# Patient Record
Sex: FEMALE | Race: WHITE | Employment: FULL TIME
[De-identification: names, ages, dates, MRNs, and addresses within clinical notes are randomized per-mention and may not be internally consistent; named-entity substitution may affect disease eponyms.]

---

## 2017-06-02 ENCOUNTER — TELEPHONE (OUTPATIENT)
Dept: OBGYN | Facility: CLINIC | Age: 27
End: 2017-06-02

## 2017-06-02 DIAGNOSIS — E28.2 PCOS (POLYCYSTIC OVARIAN SYNDROME): ICD-10-CM

## 2017-06-05 RX ORDER — NORGESTIMATE AND ETHINYL ESTRADIOL 0.25-0.035
KIT ORAL
Qty: 28 TABLET | Refills: 5 | Status: SHIPPED | OUTPATIENT
Start: 2017-06-05 | End: 2018-07-23 | Stop reason: SDUPTHER

## 2017-08-08 ENCOUNTER — OFFICE VISIT (OUTPATIENT)
Dept: PRIMARY CARE CLINIC | Age: 27
End: 2017-08-08
Payer: COMMERCIAL

## 2017-08-08 VITALS
DIASTOLIC BLOOD PRESSURE: 101 MMHG | WEIGHT: 293 LBS | HEIGHT: 64 IN | TEMPERATURE: 97.2 F | HEART RATE: 66 BPM | BODY MASS INDEX: 50.02 KG/M2 | RESPIRATION RATE: 18 BRPM | SYSTOLIC BLOOD PRESSURE: 138 MMHG

## 2017-08-08 DIAGNOSIS — R73.01 ELEVATED FASTING GLUCOSE: ICD-10-CM

## 2017-08-08 DIAGNOSIS — Z13.220 LIPID SCREENING: ICD-10-CM

## 2017-08-08 DIAGNOSIS — R45.0 NERVOUSNESS: ICD-10-CM

## 2017-08-08 DIAGNOSIS — I10 ESSENTIAL HYPERTENSION: Primary | ICD-10-CM

## 2017-08-08 PROCEDURE — 99214 OFFICE O/P EST MOD 30 MIN: CPT | Performed by: NURSE PRACTITIONER

## 2017-08-08 RX ORDER — LISINOPRIL 10 MG/1
10 TABLET ORAL DAILY
Qty: 90 TABLET | Refills: 1 | Status: SHIPPED | OUTPATIENT
Start: 2017-08-08 | End: 2017-08-22 | Stop reason: SDUPTHER

## 2017-08-08 RX ORDER — ESCITALOPRAM OXALATE 10 MG/1
10 TABLET ORAL DAILY
Qty: 90 TABLET | Refills: 1 | Status: SHIPPED | OUTPATIENT
Start: 2017-08-08 | End: 2019-10-25 | Stop reason: SINTOL

## 2017-08-08 ASSESSMENT — ENCOUNTER SYMPTOMS
CONSTIPATION: 0
COUGH: 0
NAUSEA: 0
WHEEZING: 0
ABDOMINAL PAIN: 0
VOMITING: 0
RHINORRHEA: 0
SORE THROAT: 0
DIARRHEA: 0
SHORTNESS OF BREATH: 0

## 2017-08-08 ASSESSMENT — PATIENT HEALTH QUESTIONNAIRE - PHQ9
2. FEELING DOWN, DEPRESSED OR HOPELESS: 1
SUM OF ALL RESPONSES TO PHQ QUESTIONS 1-9: 1
1. LITTLE INTEREST OR PLEASURE IN DOING THINGS: 0
SUM OF ALL RESPONSES TO PHQ9 QUESTIONS 1 & 2: 1

## 2017-08-17 ENCOUNTER — TELEPHONE (OUTPATIENT)
Dept: PRIMARY CARE CLINIC | Age: 27
End: 2017-08-17

## 2017-08-17 ENCOUNTER — HOSPITAL ENCOUNTER (OUTPATIENT)
Age: 27
Discharge: HOME | End: 2017-08-17
Payer: COMMERCIAL

## 2017-08-17 DIAGNOSIS — R73.01 ELEVATED FASTING GLUCOSE: ICD-10-CM

## 2017-08-17 DIAGNOSIS — Z13.220 LIPID SCREENING: ICD-10-CM

## 2017-08-17 DIAGNOSIS — I10 ESSENTIAL HYPERTENSION: ICD-10-CM

## 2017-08-17 LAB
ABSOLUTE EOS #: 0.3 K/UL (ref 0–0.4)
ABSOLUTE LYMPH #: 2.5 K/UL (ref 1–4.8)
ABSOLUTE MONO #: 0.6 K/UL (ref 0–1)
ALBUMIN SERPL-MCNC: 4.1 G/DL (ref 3.5–5.2)
ALBUMIN/GLOBULIN RATIO: 1 (ref 1–2.5)
ALP BLD-CCNC: 42 U/L (ref 35–104)
ALT SERPL-CCNC: 27 U/L (ref 5–33)
ANION GAP SERPL CALCULATED.3IONS-SCNC: 12 MMOL/L (ref 9–17)
AST SERPL-CCNC: 22 U/L
BASOPHILS # BLD: 0 %
BASOPHILS ABSOLUTE: 0 K/UL (ref 0–0.2)
BILIRUB SERPL-MCNC: 0.36 MG/DL (ref 0.3–1.2)
BUN BLDV-MCNC: 11 MG/DL (ref 6–20)
BUN/CREAT BLD: 16 (ref 9–20)
CALCIUM SERPL-MCNC: 9.2 MG/DL (ref 8.6–10.4)
CHLORIDE BLD-SCNC: 101 MMOL/L (ref 98–107)
CHOLESTEROL/HDL RATIO: 3.1
CHOLESTEROL: 165 MG/DL
CO2: 25 MMOL/L (ref 20–31)
CREAT SERPL-MCNC: 0.68 MG/DL (ref 0.5–0.9)
CREATININE URINE: 92.2 MG/DL (ref 28–217)
DIFFERENTIAL TYPE: ABNORMAL
EOSINOPHILS RELATIVE PERCENT: 3 %
ESTIMATED AVERAGE GLUCOSE: 103 MG/DL
GFR AFRICAN AMERICAN: >60 ML/MIN
GFR NON-AFRICAN AMERICAN: >60 ML/MIN
GFR SERPL CREATININE-BSD FRML MDRD: NORMAL ML/MIN/{1.73_M2}
GFR SERPL CREATININE-BSD FRML MDRD: NORMAL ML/MIN/{1.73_M2}
GLUCOSE BLD-MCNC: 95 MG/DL (ref 70–99)
HBA1C MFR BLD: 5.2 % (ref 4.8–5.9)
HCT VFR BLD CALC: 40.4 % (ref 36–46)
HDLC SERPL-MCNC: 54 MG/DL
HEMOGLOBIN: 13.6 G/DL (ref 12–16)
LDL CHOLESTEROL: 89 MG/DL (ref 0–130)
LYMPHOCYTES # BLD: 24 %
MCH RBC QN AUTO: 26.8 PG (ref 26–34)
MCHC RBC AUTO-ENTMCNC: 33.6 G/DL (ref 31–37)
MCV RBC AUTO: 79.8 FL (ref 80–100)
MICROALBUMIN/CREAT 24H UR: <12 MG/L
MICROALBUMIN/CREAT UR-RTO: 13 MCG/MG CREAT
MONOCYTES # BLD: 6 %
PDW BLD-RTO: 14.2 % (ref 12.1–15.2)
PLATELET # BLD: 267 K/UL (ref 140–450)
PLATELET ESTIMATE: ABNORMAL
PMV BLD AUTO: 9.9 FL (ref 6–12)
POTASSIUM SERPL-SCNC: 4.4 MMOL/L (ref 3.7–5.3)
RBC # BLD: 5.06 M/UL (ref 4–5.2)
RBC # BLD: ABNORMAL 10*6/UL
SEG NEUTROPHILS: 67 %
SEGMENTED NEUTROPHILS ABSOLUTE COUNT: 7 K/UL (ref 1.8–7.7)
SODIUM BLD-SCNC: 138 MMOL/L (ref 135–144)
TOTAL PROTEIN: 8.1 G/DL (ref 6.4–8.3)
TRIGL SERPL-MCNC: 109 MG/DL
VLDLC SERPL CALC-MCNC: NORMAL MG/DL (ref 1–30)
WBC # BLD: 10.4 K/UL (ref 3.5–11)
WBC # BLD: ABNORMAL 10*3/UL

## 2017-08-17 PROCEDURE — 80053 COMPREHEN METABOLIC PANEL: CPT

## 2017-08-17 PROCEDURE — 83036 HEMOGLOBIN GLYCOSYLATED A1C: CPT

## 2017-08-17 PROCEDURE — 85025 COMPLETE CBC W/AUTO DIFF WBC: CPT

## 2017-08-17 PROCEDURE — 82043 UR ALBUMIN QUANTITATIVE: CPT

## 2017-08-17 PROCEDURE — 80061 LIPID PANEL: CPT

## 2017-08-17 PROCEDURE — 82570 ASSAY OF URINE CREATININE: CPT

## 2017-08-17 PROCEDURE — 36415 COLL VENOUS BLD VENIPUNCTURE: CPT

## 2017-08-22 ENCOUNTER — NURSE ONLY (OUTPATIENT)
Dept: PRIMARY CARE CLINIC | Age: 27
End: 2017-08-22

## 2017-08-22 VITALS
WEIGHT: 293 LBS | RESPIRATION RATE: 18 BRPM | DIASTOLIC BLOOD PRESSURE: 91 MMHG | TEMPERATURE: 97.1 F | HEIGHT: 64 IN | BODY MASS INDEX: 50.02 KG/M2 | SYSTOLIC BLOOD PRESSURE: 142 MMHG | HEART RATE: 77 BPM

## 2017-08-22 DIAGNOSIS — I10 ESSENTIAL HYPERTENSION: ICD-10-CM

## 2017-08-22 RX ORDER — LISINOPRIL 10 MG/1
20 TABLET ORAL DAILY
Qty: 90 TABLET | Refills: 1 | Status: SHIPPED | OUTPATIENT
Start: 2017-08-22 | End: 2017-08-22 | Stop reason: SDUPTHER

## 2017-08-22 RX ORDER — LISINOPRIL 20 MG/1
20 TABLET ORAL DAILY
Qty: 90 TABLET | Refills: 1 | Status: SHIPPED | OUTPATIENT
Start: 2017-08-22 | End: 2020-08-13

## 2018-02-06 ENCOUNTER — HOSPITAL ENCOUNTER (EMERGENCY)
Age: 28
Discharge: HOME | End: 2018-02-06
Payer: COMMERCIAL

## 2018-02-06 VITALS
TEMPERATURE: 98.8 F | HEART RATE: 77 BPM | SYSTOLIC BLOOD PRESSURE: 144 MMHG | RESPIRATION RATE: 14 BRPM | OXYGEN SATURATION: 98 % | DIASTOLIC BLOOD PRESSURE: 85 MMHG

## 2018-02-06 DIAGNOSIS — R11.2 NAUSEA VOMITING AND DIARRHEA: Primary | ICD-10-CM

## 2018-02-06 DIAGNOSIS — R19.7 NAUSEA VOMITING AND DIARRHEA: Primary | ICD-10-CM

## 2018-02-06 LAB
-: ABNORMAL
ABSOLUTE EOS #: 0.4 K/UL (ref 0–0.4)
ABSOLUTE IMMATURE GRANULOCYTE: ABNORMAL K/UL (ref 0–0.3)
ABSOLUTE LYMPH #: 2.5 K/UL (ref 1–4.8)
ABSOLUTE MONO #: 0.7 K/UL (ref 0–1)
ALBUMIN SERPL-MCNC: 3.8 G/DL (ref 3.5–5.2)
ALBUMIN/GLOBULIN RATIO: 1 (ref 1–2.5)
ALP BLD-CCNC: 47 U/L (ref 35–104)
ALT SERPL-CCNC: 37 U/L (ref 5–33)
AMORPHOUS: ABNORMAL
ANION GAP SERPL CALCULATED.3IONS-SCNC: 11 MMOL/L (ref 9–17)
AST SERPL-CCNC: 22 U/L
BACTERIA: ABNORMAL
BASOPHILS # BLD: 1 % (ref 0–2)
BASOPHILS ABSOLUTE: 0.1 K/UL (ref 0–0.2)
BILIRUB SERPL-MCNC: 0.41 MG/DL (ref 0.3–1.2)
BILIRUBIN URINE: NEGATIVE
BUN BLDV-MCNC: 13 MG/DL (ref 6–20)
BUN/CREAT BLD: 22 (ref 9–20)
CALCIUM SERPL-MCNC: 9.1 MG/DL (ref 8.6–10.4)
CASTS UA: ABNORMAL /LPF
CHLORIDE BLD-SCNC: 101 MMOL/L (ref 98–107)
CO2: 26 MMOL/L (ref 20–31)
COLOR: YELLOW
COMMENT UA: ABNORMAL
CREAT SERPL-MCNC: 0.6 MG/DL (ref 0.5–0.9)
CRYSTALS, UA: ABNORMAL /HPF
DIFFERENTIAL TYPE: ABNORMAL
DIRECT EXAM: NORMAL
EOSINOPHILS RELATIVE PERCENT: 4 % (ref 0–8)
EPITHELIAL CELLS UA: ABNORMAL /HPF (ref 0–25)
GFR AFRICAN AMERICAN: >60 ML/MIN
GFR NON-AFRICAN AMERICAN: >60 ML/MIN
GFR SERPL CREATININE-BSD FRML MDRD: ABNORMAL ML/MIN/{1.73_M2}
GFR SERPL CREATININE-BSD FRML MDRD: ABNORMAL ML/MIN/{1.73_M2}
GLUCOSE BLD-MCNC: 127 MG/DL (ref 70–99)
GLUCOSE URINE: NEGATIVE
HCG(URINE) PREGNANCY TEST: NEGATIVE
HCT VFR BLD CALC: 41 % (ref 36–46)
HEMOGLOBIN: 13.6 G/DL (ref 12–16)
IMMATURE GRANULOCYTES: ABNORMAL %
KETONES, URINE: NEGATIVE
LACTIC ACID, WHOLE BLOOD: NORMAL MMOL/L (ref 0.7–2.1)
LACTIC ACID: 0.9 MMOL/L (ref 0.5–2.2)
LEUKOCYTE ESTERASE, URINE: ABNORMAL
LIPASE: 19 U/L (ref 13–60)
LYMPHOCYTES # BLD: 24 % (ref 24–44)
Lab: NORMAL
MCH RBC QN AUTO: 26.4 PG (ref 26–34)
MCHC RBC AUTO-ENTMCNC: 33.2 G/DL (ref 31–37)
MCV RBC AUTO: 79.7 FL (ref 80–100)
MONOCYTES # BLD: 7 % (ref 0–12)
MUCUS: ABNORMAL
NITRITE, URINE: NEGATIVE
NRBC AUTOMATED: ABNORMAL PER 100 WBC
OTHER OBSERVATIONS UA: ABNORMAL
PDW BLD-RTO: 13.9 % (ref 12.1–15.2)
PH UA: 7.5 (ref 5–9)
PLATELET # BLD: 304 K/UL (ref 140–450)
PLATELET ESTIMATE: ABNORMAL
PMV BLD AUTO: 9.1 FL (ref 6–12)
POTASSIUM SERPL-SCNC: 4.2 MMOL/L (ref 3.7–5.3)
PROTEIN UA: NEGATIVE
RBC # BLD: 5.15 M/UL (ref 4–5.2)
RBC # BLD: ABNORMAL 10*6/UL
RBC UA: ABNORMAL /HPF (ref 0–2)
RENAL EPITHELIAL, UA: ABNORMAL /HPF
SEG NEUTROPHILS: 64 % (ref 36–66)
SEGMENTED NEUTROPHILS ABSOLUTE COUNT: 6.7 K/UL (ref 1.8–7.7)
SODIUM BLD-SCNC: 138 MMOL/L (ref 135–144)
SPECIFIC GRAVITY UA: 1.01 (ref 1.01–1.02)
SPECIMEN DESCRIPTION: NORMAL
STATUS: NORMAL
TOTAL PROTEIN: 7.7 G/DL (ref 6.4–8.3)
TRICHOMONAS: ABNORMAL
TURBIDITY: CLEAR
URINE HGB: NEGATIVE
UROBILINOGEN, URINE: NORMAL
WBC # BLD: 10.5 K/UL (ref 3.5–11)
WBC # BLD: ABNORMAL 10*3/UL
WBC UA: ABNORMAL /HPF (ref 0–5)
YEAST: ABNORMAL

## 2018-02-06 PROCEDURE — 6360000002 HC RX W HCPCS: Performed by: PHYSICIAN ASSISTANT

## 2018-02-06 PROCEDURE — 6370000000 HC RX 637 (ALT 250 FOR IP): Performed by: PHYSICIAN ASSISTANT

## 2018-02-06 PROCEDURE — 36415 COLL VENOUS BLD VENIPUNCTURE: CPT

## 2018-02-06 PROCEDURE — 87804 INFLUENZA ASSAY W/OPTIC: CPT

## 2018-02-06 PROCEDURE — 84703 CHORIONIC GONADOTROPIN ASSAY: CPT

## 2018-02-06 PROCEDURE — 80053 COMPREHEN METABOLIC PANEL: CPT

## 2018-02-06 PROCEDURE — 81001 URINALYSIS AUTO W/SCOPE: CPT

## 2018-02-06 PROCEDURE — 83605 ASSAY OF LACTIC ACID: CPT

## 2018-02-06 PROCEDURE — 85025 COMPLETE CBC W/AUTO DIFF WBC: CPT

## 2018-02-06 PROCEDURE — 96374 THER/PROPH/DIAG INJ IV PUSH: CPT

## 2018-02-06 PROCEDURE — 96361 HYDRATE IV INFUSION ADD-ON: CPT

## 2018-02-06 PROCEDURE — 87086 URINE CULTURE/COLONY COUNT: CPT

## 2018-02-06 PROCEDURE — 99283 EMERGENCY DEPT VISIT LOW MDM: CPT

## 2018-02-06 PROCEDURE — 96375 TX/PRO/DX INJ NEW DRUG ADDON: CPT

## 2018-02-06 PROCEDURE — 83690 ASSAY OF LIPASE: CPT

## 2018-02-06 PROCEDURE — 2580000003 HC RX 258: Performed by: PHYSICIAN ASSISTANT

## 2018-02-06 RX ORDER — DIPHENHYDRAMINE HYDROCHLORIDE 50 MG/ML
25 INJECTION INTRAMUSCULAR; INTRAVENOUS ONCE
Status: COMPLETED | OUTPATIENT
Start: 2018-02-06 | End: 2018-02-06

## 2018-02-06 RX ORDER — DEXAMETHASONE SODIUM PHOSPHATE 4 MG/ML
4 INJECTION, SOLUTION INTRA-ARTICULAR; INTRALESIONAL; INTRAMUSCULAR; INTRAVENOUS; SOFT TISSUE ONCE
Status: COMPLETED | OUTPATIENT
Start: 2018-02-06 | End: 2018-02-06

## 2018-02-06 RX ORDER — 0.9 % SODIUM CHLORIDE 0.9 %
1000 INTRAVENOUS SOLUTION INTRAVENOUS ONCE
Status: COMPLETED | OUTPATIENT
Start: 2018-02-06 | End: 2018-02-06

## 2018-02-06 RX ORDER — DICYCLOMINE HYDROCHLORIDE 10 MG/1
10 CAPSULE ORAL ONCE
Status: COMPLETED | OUTPATIENT
Start: 2018-02-06 | End: 2018-02-06

## 2018-02-06 RX ORDER — DICYCLOMINE HYDROCHLORIDE 10 MG/1
10 CAPSULE ORAL
Qty: 30 CAPSULE | Refills: 0 | Status: SHIPPED | OUTPATIENT
Start: 2018-02-06 | End: 2019-06-18 | Stop reason: ALTCHOICE

## 2018-02-06 RX ORDER — ONDANSETRON 4 MG/1
4 TABLET, ORALLY DISINTEGRATING ORAL EVERY 8 HOURS PRN
Qty: 12 TABLET | Refills: 0 | Status: SHIPPED | OUTPATIENT
Start: 2018-02-06 | End: 2019-06-18 | Stop reason: ALTCHOICE

## 2018-02-06 RX ORDER — KETOROLAC TROMETHAMINE 30 MG/ML
30 INJECTION, SOLUTION INTRAMUSCULAR; INTRAVENOUS ONCE
Status: COMPLETED | OUTPATIENT
Start: 2018-02-06 | End: 2018-02-06

## 2018-02-06 RX ORDER — ONDANSETRON 2 MG/ML
4 INJECTION INTRAMUSCULAR; INTRAVENOUS ONCE
Status: COMPLETED | OUTPATIENT
Start: 2018-02-06 | End: 2018-02-06

## 2018-02-06 RX ADMIN — SODIUM CHLORIDE 1000 ML: 9 INJECTION, SOLUTION INTRAVENOUS at 14:42

## 2018-02-06 RX ADMIN — ONDANSETRON 4 MG: 2 INJECTION INTRAMUSCULAR; INTRAVENOUS at 14:43

## 2018-02-06 RX ADMIN — DIPHENHYDRAMINE HYDROCHLORIDE 25 MG: 50 INJECTION, SOLUTION INTRAMUSCULAR; INTRAVENOUS at 16:14

## 2018-02-06 RX ADMIN — KETOROLAC TROMETHAMINE 30 MG: 30 INJECTION, SOLUTION INTRAMUSCULAR at 16:11

## 2018-02-06 RX ADMIN — DICYCLOMINE HYDROCHLORIDE 10 MG: 10 CAPSULE ORAL at 14:44

## 2018-02-06 RX ADMIN — DEXAMETHASONE SODIUM PHOSPHATE 4 MG: 4 INJECTION, SOLUTION INTRAMUSCULAR; INTRAVENOUS at 16:13

## 2018-02-06 ASSESSMENT — ENCOUNTER SYMPTOMS
NAUSEA: 1
RHINORRHEA: 0
BLOOD IN STOOL: 0
SORE THROAT: 0
EYE REDNESS: 0
CHEST TIGHTNESS: 0
COUGH: 0
VOMITING: 1
BACK PAIN: 0
DIARRHEA: 1
CONSTIPATION: 0
ABDOMINAL PAIN: 1
WHEEZING: 0
SHORTNESS OF BREATH: 0
EYE DISCHARGE: 0

## 2018-02-06 ASSESSMENT — PAIN SCALES - GENERAL
PAINLEVEL_OUTOF10: 7
PAINLEVEL_OUTOF10: 5

## 2018-02-06 ASSESSMENT — PAIN DESCRIPTION - PAIN TYPE: TYPE: ACUTE PAIN

## 2018-02-06 ASSESSMENT — PAIN DESCRIPTION - DESCRIPTORS: DESCRIPTORS: DISCOMFORT

## 2018-02-06 ASSESSMENT — PAIN DESCRIPTION - LOCATION: LOCATION: ABDOMEN;HEAD

## 2018-02-06 ASSESSMENT — PAIN DESCRIPTION - FREQUENCY: FREQUENCY: CONTINUOUS

## 2018-02-06 ASSESSMENT — PAIN DESCRIPTION - ORIENTATION: ORIENTATION: MID

## 2018-02-06 NOTE — ED PROVIDER NOTES
and polyuria. Genitourinary: Negative for decreased urine volume, difficulty urinating, dysuria, frequency and hematuria. Musculoskeletal: Negative for arthralgias, back pain and myalgias. Skin: Negative for pallor and rash. Allergic/Immunologic: Negative for food allergies and immunocompromised state. Neurological: Negative for dizziness, syncope, weakness and light-headedness. Hematological: Negative for adenopathy. Does not bruise/bleed easily. Psychiatric/Behavioral: Negative for behavioral problems and suicidal ideas. The patient is not nervous/anxious. Except as noted above the remainder of the review of systems was reviewed and negative. PAST MEDICAL HISTORY     Past Medical History:   Diagnosis Date    Hypertension     Obesity          SURGICAL HISTORY     History reviewed. No pertinent surgical history. CURRENT MEDICATIONS       Discharge Medication List as of 2/6/2018  4:37 PM      CONTINUE these medications which have NOT CHANGED    Details   lisinopril (PRINIVIL;ZESTRIL) 20 MG tablet Take 1 tablet by mouth daily, Disp-90 tablet, R-1Normal      escitalopram (LEXAPRO) 10 MG tablet Take 1 tablet by mouth daily, Disp-90 tablet, R-1Normal      norgestimate-ethinyl estradiol (SPRINTEC 28) 0.25-35 MG-MCG per tablet TAKE ONE TABLET BY MOUTH ONCE DAILY, Disp-28 tablet, R-5Normal      metFORMIN (GLUCOPHAGE) 500 MG tablet TAKE ONE TABLET BY MOUTH THREE TIMES DAILY, Disp-90 tablet, R-3Normal      Multiple Vitamins-Minerals (MULTI FOR HER PO) Take 1 capsule by mouth daily      acetaminophen (TYLENOL) 500 MG tablet Take  by mouth every 6 hours as needed. ALLERGIES     Review of patient's allergies indicates no known allergies.     FAMILY HISTORY       Family History   Problem Relation Age of Onset    Cancer Mother     Cancer Father     Diabetes Maternal Grandmother     Heart Disease Maternal Grandmother     Cancer Paternal Grandmother     Cancer Paternal Grandfather SOCIAL HISTORY       Social History     Social History    Marital status: Single     Spouse name: N/A    Number of children: N/A    Years of education: N/A     Social History Main Topics    Smoking status: Never Smoker    Smokeless tobacco: Never Used    Alcohol use 0.0 oz/week     1 Glasses of wine, 1 Standard drinks or equivalent per week      Comment: Occ    Drug use: No    Sexual activity: Yes     Partners: Male     Other Topics Concern    None     Social History Narrative    None       SCREENINGS    Brandon Coma Scale  Eye Opening: Spontaneous  Best Verbal Response: Oriented  Best Motor Response: Obeys commands  Brandon Coma Scale Score: 15        PHYSICAL EXAM    (up to 7 for level 4, 8 or more for level 5)     ED Triage Vitals [02/06/18 1425]   BP Temp Temp Source Pulse Resp SpO2 Height Weight   (!) 174/108 98.8 °F (37.1 °C) Tympanic 67 16 96 % -- --       Physical Exam   Constitutional: She is oriented to person, place, and time. She appears well-developed and well-nourished. No distress. HENT:   Head: Normocephalic and atraumatic. Right Ear: External ear normal.   Left Ear: External ear normal.   Mouth/Throat: Oropharynx is clear and moist. No oropharyngeal exudate. Eyes: Conjunctivae and EOM are normal. Pupils are equal, round, and reactive to light. Right eye exhibits no discharge. Left eye exhibits no discharge. No scleral icterus. Neck: Normal range of motion. Neck supple. No tracheal deviation present. Cardiovascular: Normal rate, regular rhythm and intact distal pulses. Exam reveals no gallop and no friction rub. No murmur heard. Pulmonary/Chest: Effort normal and breath sounds normal. No stridor. No respiratory distress. She has no wheezes. Abdominal: Soft. Bowel sounds are normal. She exhibits no distension. There is no rigidity, no rebound, no guarding and no CVA tenderness. Diffuse discomfort to abdomen no point tenderness abdomen soft.   No rigidity no

## 2018-02-07 LAB
CULTURE: NORMAL
CULTURE: NORMAL
Lab: NORMAL
SPECIMEN DESCRIPTION: NORMAL
SPECIMEN DESCRIPTION: NORMAL
STATUS: NORMAL

## 2018-07-23 DIAGNOSIS — E28.2 PCOS (POLYCYSTIC OVARIAN SYNDROME): ICD-10-CM

## 2018-07-23 RX ORDER — NORGESTIMATE AND ETHINYL ESTRADIOL 0.25-0.035
KIT ORAL
Qty: 28 TABLET | Refills: 12 | Status: SHIPPED | OUTPATIENT
Start: 2018-07-23 | End: 2019-11-04 | Stop reason: ALTCHOICE

## 2018-07-23 NOTE — TELEPHONE ENCOUNTER
Patient scheduled for yearly on 08/20/18. She states has not been having her periods and has been taking metformin and sprintec \"as needed. \"      Next Visit Date:  Future Appointments  Date Time Provider Benny Villai   8/20/2018 2:00 PM ERLINDA Weber - [facility]              Health Maintenance   Topic Date Due    DTaP/Tdap/Td vaccine (1 - Tdap) 02/25/2008    Flu vaccine (1) 09/01/2018    Cervical cancer screen  01/25/2019    Potassium monitoring  02/06/2019    Creatinine monitoring  02/06/2019    HIV screen  Addressed             (applicable per patient's age: Cancer Screenings, Depression Screening, Fall Risk Screening, Immunizations)    Hemoglobin A1C (%)   Date Value   08/17/2017 5.2     Microalb/Crt.  Ratio (mcg/mg creat)   Date Value   08/17/2017 13     LDL Cholesterol (mg/dL)   Date Value   08/17/2017 89     AST (U/L)   Date Value   02/06/2018 22     ALT (U/L)   Date Value   02/06/2018 37 (H)     BUN (mg/dL)   Date Value   02/06/2018 13      (goal A1C is < 7)   (goal LDL is <100) need 30-50% reduction from baseline     BP Readings from Last 3 Encounters:   02/06/18 (!) 144/85   08/22/17 (!) 142/91   08/08/17 (!) 138/101    (goal /80)      All Future Testing planned in [facility]:  Lab Frequency Next Occurrence       Patient Active Problem List:     Fatigue     Morbid obesity with BMI of 50.0-59.9, adult (HCC)     Essential hypertension     Nervousness

## 2019-06-18 ENCOUNTER — HOSPITAL ENCOUNTER (OUTPATIENT)
Age: 29
Setting detail: SPECIMEN
Discharge: HOME | End: 2019-06-18
Payer: COMMERCIAL

## 2019-06-18 ENCOUNTER — OFFICE VISIT (OUTPATIENT)
Dept: OBGYN | Age: 29
End: 2019-06-18
Payer: COMMERCIAL

## 2019-06-18 VITALS
DIASTOLIC BLOOD PRESSURE: 74 MMHG | HEIGHT: 64 IN | SYSTOLIC BLOOD PRESSURE: 136 MMHG | BODY MASS INDEX: 50.02 KG/M2 | WEIGHT: 293 LBS

## 2019-06-18 DIAGNOSIS — Z01.419 WOMEN'S ANNUAL ROUTINE GYNECOLOGICAL EXAMINATION: ICD-10-CM

## 2019-06-18 DIAGNOSIS — N91.2 AMENORRHEA: ICD-10-CM

## 2019-06-18 DIAGNOSIS — Z01.419 WOMEN'S ANNUAL ROUTINE GYNECOLOGICAL EXAMINATION: Primary | ICD-10-CM

## 2019-06-18 PROCEDURE — 99395 PREV VISIT EST AGE 18-39: CPT | Performed by: ADVANCED PRACTICE MIDWIFE

## 2019-06-18 PROCEDURE — 87624 HPV HI-RISK TYP POOLED RSLT: CPT

## 2019-06-18 PROCEDURE — G0145 SCR C/V CYTO,THINLAYER,RESCR: HCPCS

## 2019-06-18 RX ORDER — MEDROXYPROGESTERONE ACETATE 10 MG/1
10 TABLET ORAL DAILY
Qty: 10 TABLET | Refills: 0 | Status: SHIPPED | OUTPATIENT
Start: 2019-06-18 | End: 2019-11-04 | Stop reason: ALTCHOICE

## 2019-06-18 ASSESSMENT — ENCOUNTER SYMPTOMS
EYES NEGATIVE: 1
ALLERGIC/IMMUNOLOGIC NEGATIVE: 1
RESPIRATORY NEGATIVE: 1
GASTROINTESTINAL NEGATIVE: 1
BACK PAIN: 1

## 2019-06-18 NOTE — PROGRESS NOTES
never    Last Mammogram: never    Last Dexascan never    Last colorectal screen- type:n/a  date  n/a    Do you do self breast exams: Yes    Past Medical History:   Diagnosis Date    Hypertension     Obesity        History reviewed. No pertinent surgical history. Family History   Problem Relation Age of Onset    Cancer Mother     Cancer Father     Diabetes Maternal Grandmother     Heart Disease Maternal Grandmother     Cancer Paternal Grandmother     Cancer Paternal Grandfather        Chief Complaint   Patient presents with    Gynecologic Exam          PE:  Vital Signs  Blood pressure 136/74, height 5' 4\" (1.626 m), weight (!) 309 lb (140.2 kg), last menstrual period 03/18/2019, not currently breastfeeding. Labs:    No results found for this visit on 06/18/19. NURSE: DIANA    HPI: pt here for yearly well woman and pap. She states that she would like to try to get pregnant but her periods are not regular. She was on OCP for treatment of her PCOS and she had more regular cycles but since she has been off she has not had a period. She would like to try something to help her ovulate regularly. Review of Systems   Constitutional: Positive for activity change and appetite change. Negative for chills, diaphoresis, fatigue, fever and unexpected weight change. Pt actively trying to lose weight. HENT: Negative. Eyes: Negative. Respiratory: Negative. Cardiovascular: Negative for chest pain, palpitations and leg swelling. Gastrointestinal: Negative. Endocrine: Negative. Genitourinary: Positive for menstrual problem. Negative for decreased urine volume, difficulty urinating, dyspareunia, dysuria, enuresis, flank pain, frequency, genital sores, hematuria, pelvic pain, urgency, vaginal bleeding, vaginal discharge and vaginal pain. Musculoskeletal: Positive for back pain. Negative for arthralgias, gait problem, joint swelling, myalgias and neck pain.         Low back pain normal, Lesions absent, Discharge absent, Tenderness absent, Enlargement absent, Nodularity absent  Uterus:  Size normal, Contour normal, Position normal, Masses absent, Consistency; normal, Support normal, Tenderness absent  Adenexa:   Masses absent, Tenderness absent, Enlargement absent, Nodularity absent                                    Vaginal discharge: no vaginal discharge, no urethral discharge, no vulvar exudate

## 2019-06-21 LAB
CYTOLOGY REPORT: NORMAL
HPV SAMPLE: NORMAL
HPV, GENOTYPE 16: NOT DETECTED
HPV, GENOTYPE 18: NOT DETECTED
HPV, HIGH RISK OTHER: NOT DETECTED
HPV, INTERPRETATION: NORMAL
SPECIMEN DESCRIPTION: NORMAL

## 2019-06-26 DIAGNOSIS — I10 ESSENTIAL HYPERTENSION: ICD-10-CM

## 2019-06-27 RX ORDER — LISINOPRIL 20 MG/1
20 TABLET ORAL DAILY
Qty: 90 TABLET | Refills: 1 | OUTPATIENT
Start: 2019-06-27

## 2019-07-09 ENCOUNTER — OFFICE VISIT (OUTPATIENT)
Dept: OBGYN | Age: 29
End: 2019-07-09
Payer: COMMERCIAL

## 2019-07-09 VITALS
WEIGHT: 293 LBS | BODY MASS INDEX: 50.02 KG/M2 | DIASTOLIC BLOOD PRESSURE: 86 MMHG | SYSTOLIC BLOOD PRESSURE: 136 MMHG | HEIGHT: 64 IN

## 2019-07-09 DIAGNOSIS — N91.2 AMENORRHEA: Primary | ICD-10-CM

## 2019-07-09 LAB
CONTROL: PRESENT
PREGNANCY TEST URINE, POC: NORMAL

## 2019-07-09 PROCEDURE — 81025 URINE PREGNANCY TEST: CPT | Performed by: ADVANCED PRACTICE MIDWIFE

## 2019-07-09 PROCEDURE — 99213 OFFICE O/P EST LOW 20 MIN: CPT | Performed by: ADVANCED PRACTICE MIDWIFE

## 2019-07-09 ASSESSMENT — PATIENT HEALTH QUESTIONNAIRE - PHQ9: DEPRESSION UNABLE TO ASSESS: PT REFUSES

## 2019-07-09 NOTE — PROGRESS NOTES
PROBLEM VISIT     Date of service: 2019    Moira Carmona  Is a 26 y.o. single female    PT's PCP is: ERLINDA Harding - JOSE MIGUEL     : 1990                                             Subjective:       Patient's last menstrual period was 2019. OB History   No data available        Social History     Tobacco Use   Smoking Status Never Smoker   Smokeless Tobacco Never Used        Social History     Substance and Sexual Activity   Alcohol Use Yes    Alcohol/week: 0.0 oz    Types: 1 Glasses of wine, 1 Standard drinks or equivalent per week    Comment: Occ       Social History     Substance and Sexual Activity   Sexual Activity Yes    Partners: Male       Allergies: Patient has no known allergies. Chief Complaint   Patient presents with    Irregular Menses     pt was put on provera but still has not started her period. Last Yearly:  2019    Last pap: 19    Last HPV: 19 neg     PE:  Vital Signs  Blood pressure 136/86, height 5' 4\" (1.626 m), weight (!) 303 lb (137.4 kg), last menstrual period 2019, not currently breastfeeding. NURSE: ANATOLIY    HPI: pt here today and states she took the provera but has not started her period yet. Yes  PT denies fever, chills, nausea and vomiting       Assessment and Plan          Diagnosis Orders   1. Amenorrhea  POCT urine pregnancy     If period starts by  run  day progesterone    If period does not start do a MRI of the brain and PCOS labs with tsh        I am having Ibeth HAWK Moro maintain her acetaminophen, Multiple Vitamins-Minerals (MULTI FOR HER PO), escitalopram, lisinopril, metFORMIN, norgestimate-ethinyl estradiol, and medroxyPROGESTERone. No follow-ups on file. There are no Patient Instructions on file for this visit. Over 50% of time spent on counseling and care coordination on: see assessment and plan,  She was also counseled on her preventative health maintenance recommendations and follow-up.

## 2019-07-16 ENCOUNTER — TELEPHONE (OUTPATIENT)
Dept: OBGYN | Age: 29
End: 2019-07-16

## 2019-07-16 ENCOUNTER — HOSPITAL ENCOUNTER (OUTPATIENT)
Age: 29
Setting detail: SPECIMEN
Discharge: HOME | End: 2019-07-16
Payer: COMMERCIAL

## 2019-07-16 DIAGNOSIS — N91.2 AMENORRHEA: Primary | ICD-10-CM

## 2019-07-17 ENCOUNTER — HOSPITAL ENCOUNTER (OUTPATIENT)
Age: 29
Discharge: HOME | End: 2019-07-17
Payer: COMMERCIAL

## 2019-07-17 DIAGNOSIS — N91.2 AMENORRHEA: ICD-10-CM

## 2019-07-17 LAB
ESTRADIOL LEVEL: 47 PG/ML (ref 27–314)
FOLLICLE STIMULATING HORMONE: 8.2 U/L (ref 1.7–21.5)
HCG QUANTITATIVE: <1 IU/L
LH: 9 U/L (ref 1–95.6)
PROGESTERONE LEVEL: 0.08 NG/ML
PROLACTIN: 9.92 UG/L (ref 4.79–23.3)
TSH SERPL DL<=0.05 MIU/L-ACNC: 2.6 MIU/L (ref 0.3–5)

## 2019-07-17 PROCEDURE — 84443 ASSAY THYROID STIM HORMONE: CPT

## 2019-07-17 PROCEDURE — 84144 ASSAY OF PROGESTERONE: CPT

## 2019-07-17 PROCEDURE — 83001 ASSAY OF GONADOTROPIN (FSH): CPT

## 2019-07-17 PROCEDURE — 36415 COLL VENOUS BLD VENIPUNCTURE: CPT

## 2019-07-17 PROCEDURE — 83002 ASSAY OF GONADOTROPIN (LH): CPT

## 2019-07-17 PROCEDURE — 82670 ASSAY OF TOTAL ESTRADIOL: CPT

## 2019-07-17 PROCEDURE — 84702 CHORIONIC GONADOTROPIN TEST: CPT

## 2019-07-17 PROCEDURE — 84146 ASSAY OF PROLACTIN: CPT

## 2019-08-28 DIAGNOSIS — E28.2 PCOS (POLYCYSTIC OVARIAN SYNDROME): ICD-10-CM

## 2019-09-12 ENCOUNTER — OFFICE VISIT (OUTPATIENT)
Dept: OBGYN | Age: 29
End: 2019-09-12
Payer: COMMERCIAL

## 2019-09-12 VITALS
HEIGHT: 64 IN | WEIGHT: 293 LBS | DIASTOLIC BLOOD PRESSURE: 78 MMHG | BODY MASS INDEX: 50.02 KG/M2 | SYSTOLIC BLOOD PRESSURE: 122 MMHG

## 2019-09-12 DIAGNOSIS — E28.2 PCOS (POLYCYSTIC OVARIAN SYNDROME): Primary | ICD-10-CM

## 2019-09-12 DIAGNOSIS — N91.2 AMENORRHEA: ICD-10-CM

## 2019-09-12 PROCEDURE — 76830 TRANSVAGINAL US NON-OB: CPT | Performed by: OBSTETRICS & GYNECOLOGY

## 2019-09-12 PROCEDURE — 99213 OFFICE O/P EST LOW 20 MIN: CPT | Performed by: ADVANCED PRACTICE MIDWIFE

## 2019-09-12 RX ORDER — CIPROFLOXACIN HYDROCHLORIDE 3.5 MG/ML
SOLUTION/ DROPS TOPICAL
COMMUNITY
Start: 2019-07-31 | End: 2019-11-04 | Stop reason: ALTCHOICE

## 2019-09-12 ASSESSMENT — PATIENT HEALTH QUESTIONNAIRE - PHQ9: DEPRESSION UNABLE TO ASSESS: PT REFUSES

## 2019-09-12 NOTE — PROGRESS NOTES
ciprofloxacin. Return in about 1 week (around 9/19/2019) for EMB- for calcification on endometrium. There are no Patient Instructions on file for this visit. Over 50% of time spent on counseling and care coordination on: see assessment and plan,  She was also counseled on her preventative health maintenance recommendations and follow-up.         FF time: 15 min      Kelby Dubin Pool,9/12/2019 4:54 PM

## 2019-09-23 DIAGNOSIS — N91.2 AMENORRHEA: Primary | ICD-10-CM

## 2019-10-14 ENCOUNTER — HOSPITAL ENCOUNTER (OUTPATIENT)
Age: 29
Discharge: HOME | End: 2019-10-14
Payer: COMMERCIAL

## 2019-10-14 ENCOUNTER — PROCEDURE VISIT (OUTPATIENT)
Dept: OBGYN | Age: 29
End: 2019-10-14
Payer: COMMERCIAL

## 2019-10-14 ENCOUNTER — HOSPITAL ENCOUNTER (OUTPATIENT)
Age: 29
Setting detail: SPECIMEN
Discharge: HOME | End: 2019-10-14
Payer: COMMERCIAL

## 2019-10-14 VITALS
HEIGHT: 65 IN | SYSTOLIC BLOOD PRESSURE: 150 MMHG | BODY MASS INDEX: 48.82 KG/M2 | WEIGHT: 293 LBS | DIASTOLIC BLOOD PRESSURE: 98 MMHG

## 2019-10-14 DIAGNOSIS — R93.5 ABNORMAL ULTRASOUND OF ENDOMETRIUM: ICD-10-CM

## 2019-10-14 DIAGNOSIS — N91.2 AMENORRHEA: ICD-10-CM

## 2019-10-14 DIAGNOSIS — R93.5 ABNORMAL ULTRASOUND OF ENDOMETRIUM: Primary | ICD-10-CM

## 2019-10-14 LAB — HCG QUANTITATIVE: <1 IU/L

## 2019-10-14 PROCEDURE — 88305 TISSUE EXAM BY PATHOLOGIST: CPT

## 2019-10-14 PROCEDURE — 58100 BIOPSY OF UTERUS LINING: CPT | Performed by: OBSTETRICS & GYNECOLOGY

## 2019-10-14 PROCEDURE — 36415 COLL VENOUS BLD VENIPUNCTURE: CPT

## 2019-10-14 PROCEDURE — 84702 CHORIONIC GONADOTROPIN TEST: CPT

## 2019-10-17 LAB — SURGICAL PATHOLOGY REPORT: NORMAL

## 2019-10-25 ENCOUNTER — OFFICE VISIT (OUTPATIENT)
Dept: OBGYN | Age: 29
End: 2019-10-25
Payer: COMMERCIAL

## 2019-10-25 VITALS
SYSTOLIC BLOOD PRESSURE: 128 MMHG | WEIGHT: 293 LBS | BODY MASS INDEX: 48.82 KG/M2 | HEIGHT: 65 IN | DIASTOLIC BLOOD PRESSURE: 66 MMHG

## 2019-10-25 DIAGNOSIS — Z01.818 PRE-OP TESTING: Primary | ICD-10-CM

## 2019-10-25 DIAGNOSIS — N85.02 COMPLEX ATYPICAL ENDOMETRIAL HYPERPLASIA: Primary | ICD-10-CM

## 2019-10-25 PROCEDURE — 99212 OFFICE O/P EST SF 10 MIN: CPT | Performed by: OBSTETRICS & GYNECOLOGY

## 2019-11-04 ENCOUNTER — HOSPITAL ENCOUNTER (OUTPATIENT)
Dept: PREADMISSION TESTING | Age: 29
Discharge: HOME | End: 2019-11-08
Attending: OBSTETRICS & GYNECOLOGY
Payer: COMMERCIAL

## 2019-11-04 VITALS
DIASTOLIC BLOOD PRESSURE: 91 MMHG | WEIGHT: 293 LBS | TEMPERATURE: 97.2 F | SYSTOLIC BLOOD PRESSURE: 156 MMHG | HEIGHT: 65 IN | BODY MASS INDEX: 48.82 KG/M2 | HEART RATE: 88 BPM | RESPIRATION RATE: 20 BRPM | OXYGEN SATURATION: 96 %

## 2019-11-04 DIAGNOSIS — Z01.818 PRE-OP TESTING: ICD-10-CM

## 2019-11-04 LAB
ABO/RH: NORMAL
ABSOLUTE EOS #: 0.41 K/UL (ref 0–0.44)
ABSOLUTE IMMATURE GRANULOCYTE: 0.07 K/UL (ref 0–0.3)
ABSOLUTE LYMPH #: 2.88 K/UL (ref 1.1–3.7)
ABSOLUTE MONO #: 0.93 K/UL (ref 0.1–1.2)
ANTIBODY SCREEN: NEGATIVE
ARM BAND NUMBER: NORMAL
BASOPHILS # BLD: 1 % (ref 0–2)
BASOPHILS ABSOLUTE: 0.07 K/UL (ref 0–0.2)
DIFFERENTIAL TYPE: ABNORMAL
EOSINOPHILS RELATIVE PERCENT: 3 % (ref 1–4)
EXPIRATION DATE: NORMAL
HCG QUALITATIVE: NEGATIVE
HCT VFR BLD CALC: 42.3 % (ref 36.3–47.1)
HEMOGLOBIN: 13.5 G/DL (ref 11.9–15.1)
IMMATURE GRANULOCYTES: 1 %
LYMPHOCYTES # BLD: 22 % (ref 24–43)
MCH RBC QN AUTO: 26.7 PG (ref 25.2–33.5)
MCHC RBC AUTO-ENTMCNC: 31.9 G/DL (ref 28.4–34.8)
MCV RBC AUTO: 83.8 FL (ref 82.6–102.9)
MONOCYTES # BLD: 7 % (ref 3–12)
NRBC AUTOMATED: 0 PER 100 WBC
PDW BLD-RTO: 13.4 % (ref 11.8–14.4)
PLATELET # BLD: 309 K/UL (ref 138–453)
PLATELET ESTIMATE: ABNORMAL
PMV BLD AUTO: 10.7 FL (ref 8.1–13.5)
RBC # BLD: 5.05 M/UL (ref 3.95–5.11)
RBC # BLD: ABNORMAL 10*6/UL
SEG NEUTROPHILS: 67 % (ref 36–65)
SEGMENTED NEUTROPHILS ABSOLUTE COUNT: 8.68 K/UL (ref 1.5–8.1)
WBC # BLD: 13 K/UL (ref 3.5–11.3)
WBC # BLD: ABNORMAL 10*3/UL

## 2019-11-04 PROCEDURE — 86850 RBC ANTIBODY SCREEN: CPT

## 2019-11-04 PROCEDURE — 87086 URINE CULTURE/COLONY COUNT: CPT

## 2019-11-04 PROCEDURE — 85025 COMPLETE CBC W/AUTO DIFF WBC: CPT

## 2019-11-04 PROCEDURE — 36415 COLL VENOUS BLD VENIPUNCTURE: CPT

## 2019-11-04 PROCEDURE — 84703 CHORIONIC GONADOTROPIN ASSAY: CPT

## 2019-11-04 PROCEDURE — 86900 BLOOD TYPING SEROLOGIC ABO: CPT

## 2019-11-04 PROCEDURE — 86901 BLOOD TYPING SEROLOGIC RH(D): CPT

## 2019-11-04 RX ORDER — SODIUM CHLORIDE, SODIUM LACTATE, POTASSIUM CHLORIDE, CALCIUM CHLORIDE 600; 310; 30; 20 MG/100ML; MG/100ML; MG/100ML; MG/100ML
INJECTION, SOLUTION INTRAVENOUS CONTINUOUS
Status: CANCELLED | OUTPATIENT
Start: 2019-11-04

## 2019-11-05 ENCOUNTER — ANESTHESIA EVENT (OUTPATIENT)
Dept: OPERATING ROOM | Age: 29
End: 2019-11-05
Payer: COMMERCIAL

## 2019-11-05 LAB
CULTURE: NORMAL
Lab: NORMAL
SPECIMEN DESCRIPTION: NORMAL

## 2019-11-06 ENCOUNTER — ANESTHESIA (OUTPATIENT)
Dept: OPERATING ROOM | Age: 29
End: 2019-11-06
Payer: COMMERCIAL

## 2019-11-06 ENCOUNTER — HOSPITAL ENCOUNTER (OUTPATIENT)
Age: 29
Setting detail: OUTPATIENT SURGERY
Discharge: HOME | End: 2019-11-06
Attending: OBSTETRICS & GYNECOLOGY | Admitting: OBSTETRICS & GYNECOLOGY
Payer: COMMERCIAL

## 2019-11-06 VITALS
RESPIRATION RATE: 10 BRPM | OXYGEN SATURATION: 98 % | SYSTOLIC BLOOD PRESSURE: 107 MMHG | TEMPERATURE: 96.4 F | DIASTOLIC BLOOD PRESSURE: 57 MMHG

## 2019-11-06 VITALS
BODY MASS INDEX: 48.82 KG/M2 | DIASTOLIC BLOOD PRESSURE: 84 MMHG | TEMPERATURE: 97.2 F | OXYGEN SATURATION: 97 % | RESPIRATION RATE: 16 BRPM | HEART RATE: 84 BPM | SYSTOLIC BLOOD PRESSURE: 141 MMHG | HEIGHT: 65 IN | WEIGHT: 293 LBS

## 2019-11-06 DIAGNOSIS — N84.0 ENDOMETRIAL POLYP: Primary | ICD-10-CM

## 2019-11-06 PROBLEM — N85.02 COMPLEX ENDOMETRIAL HYPERPLASIA WITH ATYPIA: Status: ACTIVE | Noted: 2019-11-06

## 2019-11-06 PROCEDURE — 7100000001 HC PACU RECOVERY - ADDTL 15 MIN: Performed by: OBSTETRICS & GYNECOLOGY

## 2019-11-06 PROCEDURE — 6370000000 HC RX 637 (ALT 250 FOR IP): Performed by: OBSTETRICS & GYNECOLOGY

## 2019-11-06 PROCEDURE — 6360000002 HC RX W HCPCS: Performed by: NURSE ANESTHETIST, CERTIFIED REGISTERED

## 2019-11-06 PROCEDURE — 2580000003 HC RX 258: Performed by: OBSTETRICS & GYNECOLOGY

## 2019-11-06 PROCEDURE — 7100000011 HC PHASE II RECOVERY - ADDTL 15 MIN: Performed by: OBSTETRICS & GYNECOLOGY

## 2019-11-06 PROCEDURE — 6360000002 HC RX W HCPCS: Performed by: OBSTETRICS & GYNECOLOGY

## 2019-11-06 PROCEDURE — 2720000010 HC SURG SUPPLY STERILE: Performed by: OBSTETRICS & GYNECOLOGY

## 2019-11-06 PROCEDURE — 7100000000 HC PACU RECOVERY - FIRST 15 MIN: Performed by: OBSTETRICS & GYNECOLOGY

## 2019-11-06 PROCEDURE — 88305 TISSUE EXAM BY PATHOLOGIST: CPT

## 2019-11-06 PROCEDURE — 3600000013 HC SURGERY LEVEL 3 ADDTL 15MIN: Performed by: OBSTETRICS & GYNECOLOGY

## 2019-11-06 PROCEDURE — 3700000000 HC ANESTHESIA ATTENDED CARE: Performed by: OBSTETRICS & GYNECOLOGY

## 2019-11-06 PROCEDURE — 3700000001 HC ADD 15 MINUTES (ANESTHESIA): Performed by: OBSTETRICS & GYNECOLOGY

## 2019-11-06 PROCEDURE — 2709999900 HC NON-CHARGEABLE SUPPLY: Performed by: OBSTETRICS & GYNECOLOGY

## 2019-11-06 PROCEDURE — 3600000003 HC SURGERY LEVEL 3 BASE: Performed by: OBSTETRICS & GYNECOLOGY

## 2019-11-06 PROCEDURE — 58558 HYSTEROSCOPY BIOPSY: CPT | Performed by: OBSTETRICS & GYNECOLOGY

## 2019-11-06 PROCEDURE — 7100000010 HC PHASE II RECOVERY - FIRST 15 MIN: Performed by: OBSTETRICS & GYNECOLOGY

## 2019-11-06 PROCEDURE — 2500000003 HC RX 250 WO HCPCS: Performed by: NURSE ANESTHETIST, CERTIFIED REGISTERED

## 2019-11-06 RX ORDER — MIDAZOLAM HYDROCHLORIDE 1 MG/ML
INJECTION INTRAMUSCULAR; INTRAVENOUS PRN
Status: DISCONTINUED | OUTPATIENT
Start: 2019-11-06 | End: 2019-11-06 | Stop reason: SDUPTHER

## 2019-11-06 RX ORDER — HYDROCODONE BITARTRATE AND ACETAMINOPHEN 5; 325 MG/1; MG/1
1 TABLET ORAL EVERY 6 HOURS PRN
Qty: 12 TABLET | Refills: 0 | Status: SHIPPED | OUTPATIENT
Start: 2019-11-06 | End: 2019-11-09

## 2019-11-06 RX ORDER — HYDROCODONE BITARTRATE AND ACETAMINOPHEN 5; 325 MG/1; MG/1
2 TABLET ORAL EVERY 4 HOURS PRN
Status: DISCONTINUED | OUTPATIENT
Start: 2019-11-06 | End: 2019-11-06 | Stop reason: HOSPADM

## 2019-11-06 RX ORDER — LIDOCAINE HYDROCHLORIDE 20 MG/ML
INJECTION, SOLUTION EPIDURAL; INFILTRATION; INTRACAUDAL; PERINEURAL PRN
Status: DISCONTINUED | OUTPATIENT
Start: 2019-11-06 | End: 2019-11-06 | Stop reason: SDUPTHER

## 2019-11-06 RX ORDER — PROPOFOL 10 MG/ML
INJECTION, EMULSION INTRAVENOUS PRN
Status: DISCONTINUED | OUTPATIENT
Start: 2019-11-06 | End: 2019-11-06 | Stop reason: SDUPTHER

## 2019-11-06 RX ORDER — DEXAMETHASONE SODIUM PHOSPHATE 4 MG/ML
INJECTION, SOLUTION INTRA-ARTICULAR; INTRALESIONAL; INTRAMUSCULAR; INTRAVENOUS; SOFT TISSUE PRN
Status: DISCONTINUED | OUTPATIENT
Start: 2019-11-06 | End: 2019-11-06 | Stop reason: SDUPTHER

## 2019-11-06 RX ORDER — SODIUM CHLORIDE, SODIUM LACTATE, POTASSIUM CHLORIDE, CALCIUM CHLORIDE 600; 310; 30; 20 MG/100ML; MG/100ML; MG/100ML; MG/100ML
INJECTION, SOLUTION INTRAVENOUS CONTINUOUS
Status: DISCONTINUED | OUTPATIENT
Start: 2019-11-06 | End: 2019-11-06 | Stop reason: HOSPADM

## 2019-11-06 RX ORDER — ONDANSETRON 2 MG/ML
4 INJECTION INTRAMUSCULAR; INTRAVENOUS EVERY 8 HOURS PRN
Status: DISCONTINUED | OUTPATIENT
Start: 2019-11-06 | End: 2019-11-06 | Stop reason: HOSPADM

## 2019-11-06 RX ORDER — HYDROCODONE BITARTRATE AND ACETAMINOPHEN 5; 325 MG/1; MG/1
1 TABLET ORAL EVERY 4 HOURS PRN
Status: DISCONTINUED | OUTPATIENT
Start: 2019-11-06 | End: 2019-11-06 | Stop reason: HOSPADM

## 2019-11-06 RX ORDER — ACETAMINOPHEN 325 MG/1
650 TABLET ORAL ONCE
Status: COMPLETED | OUTPATIENT
Start: 2019-11-06 | End: 2019-11-06

## 2019-11-06 RX ORDER — SODIUM CHLORIDE 0.9 % (FLUSH) 0.9 %
10 SYRINGE (ML) INJECTION EVERY 12 HOURS SCHEDULED
Status: DISCONTINUED | OUTPATIENT
Start: 2019-11-06 | End: 2019-11-06 | Stop reason: HOSPADM

## 2019-11-06 RX ORDER — SODIUM CHLORIDE 0.9 % (FLUSH) 0.9 %
10 SYRINGE (ML) INJECTION PRN
Status: DISCONTINUED | OUTPATIENT
Start: 2019-11-06 | End: 2019-11-06 | Stop reason: HOSPADM

## 2019-11-06 RX ORDER — DIMENHYDRINATE 50 MG/1
50 TABLET ORAL ONCE
Status: COMPLETED | OUTPATIENT
Start: 2019-11-06 | End: 2019-11-06

## 2019-11-06 RX ORDER — KETOROLAC TROMETHAMINE 30 MG/ML
INJECTION, SOLUTION INTRAMUSCULAR; INTRAVENOUS PRN
Status: DISCONTINUED | OUTPATIENT
Start: 2019-11-06 | End: 2019-11-06 | Stop reason: SDUPTHER

## 2019-11-06 RX ORDER — ACETAMINOPHEN 325 MG/1
650 TABLET ORAL EVERY 4 HOURS PRN
Status: DISCONTINUED | OUTPATIENT
Start: 2019-11-06 | End: 2019-11-06 | Stop reason: HOSPADM

## 2019-11-06 RX ORDER — FENTANYL CITRATE 50 UG/ML
INJECTION, SOLUTION INTRAMUSCULAR; INTRAVENOUS PRN
Status: DISCONTINUED | OUTPATIENT
Start: 2019-11-06 | End: 2019-11-06 | Stop reason: SDUPTHER

## 2019-11-06 RX ORDER — ONDANSETRON 2 MG/ML
INJECTION INTRAMUSCULAR; INTRAVENOUS PRN
Status: DISCONTINUED | OUTPATIENT
Start: 2019-11-06 | End: 2019-11-06 | Stop reason: SDUPTHER

## 2019-11-06 RX ADMIN — PROPOFOL 200 MG: 10 INJECTION, EMULSION INTRAVENOUS at 09:01

## 2019-11-06 RX ADMIN — KETOROLAC TROMETHAMINE 30 MG: 30 INJECTION, SOLUTION INTRAMUSCULAR; INTRAVENOUS at 09:12

## 2019-11-06 RX ADMIN — FENTANYL CITRATE 100 MCG: 50 INJECTION INTRAMUSCULAR; INTRAVENOUS at 08:58

## 2019-11-06 RX ADMIN — DEXAMETHASONE SODIUM PHOSPHATE 8 MG: 4 INJECTION, SOLUTION INTRAMUSCULAR; INTRAVENOUS at 09:12

## 2019-11-06 RX ADMIN — SODIUM CHLORIDE, POTASSIUM CHLORIDE, SODIUM LACTATE AND CALCIUM CHLORIDE: 600; 310; 30; 20 INJECTION, SOLUTION INTRAVENOUS at 07:45

## 2019-11-06 RX ADMIN — DIMENHYDRINATE 50 MG: 50 TABLET ORAL at 07:30

## 2019-11-06 RX ADMIN — MIDAZOLAM 2 MG: 1 INJECTION INTRAMUSCULAR; INTRAVENOUS at 08:55

## 2019-11-06 RX ADMIN — FENTANYL CITRATE 50 MCG: 50 INJECTION INTRAMUSCULAR; INTRAVENOUS at 09:45

## 2019-11-06 RX ADMIN — FENTANYL CITRATE 25 MCG: 50 INJECTION INTRAMUSCULAR; INTRAVENOUS at 09:46

## 2019-11-06 RX ADMIN — DEXTROSE MONOHYDRATE 3 G: 50 INJECTION, SOLUTION INTRAVENOUS at 08:54

## 2019-11-06 RX ADMIN — ACETAMINOPHEN 650 MG: 325 TABLET, FILM COATED ORAL at 07:30

## 2019-11-06 RX ADMIN — HYDROCODONE BITARTRATE AND ACETAMINOPHEN 1 TABLET: 5; 325 TABLET ORAL at 11:09

## 2019-11-06 RX ADMIN — ONDANSETRON 4 MG: 2 INJECTION INTRAMUSCULAR; INTRAVENOUS at 09:12

## 2019-11-06 RX ADMIN — LIDOCAINE HYDROCHLORIDE 100 MG: 20 INJECTION, SOLUTION EPIDURAL; INFILTRATION; INTRACAUDAL at 09:01

## 2019-11-06 ASSESSMENT — PULMONARY FUNCTION TESTS
PIF_VALUE: 21
PIF_VALUE: 23
PIF_VALUE: 21
PIF_VALUE: 21
PIF_VALUE: 23
PIF_VALUE: 20
PIF_VALUE: 0
PIF_VALUE: 21
PIF_VALUE: 22
PIF_VALUE: 21
PIF_VALUE: 22
PIF_VALUE: 28
PIF_VALUE: 23
PIF_VALUE: 19
PIF_VALUE: 23
PIF_VALUE: 21
PIF_VALUE: 0
PIF_VALUE: 20
PIF_VALUE: 8
PIF_VALUE: 23
PIF_VALUE: 22
PIF_VALUE: 2
PIF_VALUE: 2
PIF_VALUE: 22
PIF_VALUE: 5
PIF_VALUE: 23
PIF_VALUE: 5
PIF_VALUE: 23
PIF_VALUE: 18
PIF_VALUE: 23
PIF_VALUE: 21
PIF_VALUE: 20
PIF_VALUE: 21
PIF_VALUE: 0
PIF_VALUE: 1
PIF_VALUE: 21
PIF_VALUE: 19
PIF_VALUE: 3
PIF_VALUE: 20
PIF_VALUE: 0
PIF_VALUE: 26
PIF_VALUE: 23
PIF_VALUE: 27
PIF_VALUE: 23
PIF_VALUE: 21
PIF_VALUE: 23
PIF_VALUE: 21
PIF_VALUE: 21
PIF_VALUE: 0
PIF_VALUE: 21
PIF_VALUE: 13
PIF_VALUE: 21
PIF_VALUE: 29
PIF_VALUE: 2
PIF_VALUE: 1
PIF_VALUE: 23

## 2019-11-06 ASSESSMENT — PAIN DESCRIPTION - DESCRIPTORS: DESCRIPTORS: ACHING

## 2019-11-06 ASSESSMENT — PAIN - FUNCTIONAL ASSESSMENT: PAIN_FUNCTIONAL_ASSESSMENT: 0-10

## 2019-11-06 ASSESSMENT — PAIN DESCRIPTION - PAIN TYPE: TYPE: SURGICAL PAIN

## 2019-11-06 ASSESSMENT — PAIN DESCRIPTION - LOCATION: LOCATION: VAGINA

## 2019-11-06 ASSESSMENT — PAIN SCALES - GENERAL
PAINLEVEL_OUTOF10: 0
PAINLEVEL_OUTOF10: 7
PAINLEVEL_OUTOF10: 0
PAINLEVEL_OUTOF10: 6

## 2019-11-06 ASSESSMENT — LIFESTYLE VARIABLES: SMOKING_STATUS: 0

## 2019-11-08 LAB — SURGICAL PATHOLOGY REPORT: NORMAL

## 2019-11-18 ENCOUNTER — OFFICE VISIT (OUTPATIENT)
Dept: OBGYN | Age: 29
End: 2019-11-18
Payer: COMMERCIAL

## 2019-11-18 VITALS
HEIGHT: 65 IN | SYSTOLIC BLOOD PRESSURE: 146 MMHG | WEIGHT: 293 LBS | BODY MASS INDEX: 48.82 KG/M2 | DIASTOLIC BLOOD PRESSURE: 76 MMHG

## 2019-11-18 DIAGNOSIS — N85.02 COMPLEX ENDOMETRIAL HYPERPLASIA WITH ATYPIA: Primary | ICD-10-CM

## 2019-11-18 PROCEDURE — 99212 OFFICE O/P EST SF 10 MIN: CPT | Performed by: OBSTETRICS & GYNECOLOGY

## 2020-04-03 ENCOUNTER — HOSPITAL ENCOUNTER (OUTPATIENT)
Age: 30
Setting detail: SPECIMEN
Discharge: HOME | End: 2020-04-03
Payer: COMMERCIAL

## 2020-04-03 PROCEDURE — U0002 COVID-19 LAB TEST NON-CDC: HCPCS

## 2020-04-06 LAB
SARS-COV-2, NAA: NORMAL
SARS-COV-2, PCR: NOT DETECTED
SARS-COV-2: NORMAL
SOURCE: NORMAL

## 2020-07-15 ENCOUNTER — OFFICE VISIT (OUTPATIENT)
Dept: OBGYN | Age: 30
End: 2020-07-15
Payer: COMMERCIAL

## 2020-07-15 VITALS
SYSTOLIC BLOOD PRESSURE: 138 MMHG | WEIGHT: 293 LBS | DIASTOLIC BLOOD PRESSURE: 88 MMHG | HEIGHT: 65 IN | BODY MASS INDEX: 48.82 KG/M2

## 2020-07-15 PROCEDURE — 99214 OFFICE O/P EST MOD 30 MIN: CPT | Performed by: ADVANCED PRACTICE MIDWIFE

## 2020-07-15 RX ORDER — LABETALOL 100 MG/1
100 TABLET, FILM COATED ORAL 2 TIMES DAILY
COMMUNITY

## 2020-07-15 RX ORDER — SERTRALINE HYDROCHLORIDE 100 MG/1
100 TABLET, FILM COATED ORAL DAILY
COMMUNITY
End: 2020-08-13

## 2020-07-15 NOTE — PROGRESS NOTES
PROBLEM VISIT     Date of service: 7/15/2020    Evelio Peña  Is a 31 y.o. single female    PT's PCP is: Felton Skiff     : 1990                                             Subjective:       No LMP recorded (lmp unknown). (Menstrual status: Irregular periods). OB History    Para Term  AB Living   0 0 0 0 0 0   SAB TAB Ectopic Molar Multiple Live Births   0 0 0 0 0 0        Social History     Tobacco Use   Smoking Status Never Smoker   Smokeless Tobacco Never Used        Social History     Substance and Sexual Activity   Alcohol Use Yes    Alcohol/week: 0.0 standard drinks    Types: 1 Glasses of wine, 1 Standard drinks or equivalent per week    Comment: Occ       Social History     Substance and Sexual Activity   Sexual Activity Yes    Partners: Male       Allergies: Adhesive tape    Chief Complaint   Patient presents with    Infertility     pt would like to discuss fertility options, pt has been trying for atleast a year now. pt does have PCOS and takes metformin 3x daily     Discuss Medications     pt would also like to start Adipex. pt states she has never been on this before. pt is currently on Bp medication but unsure of the name or dose        Last Yearly:      Last pap: 19    Last HPV: 19 neg     PE:  Vital Signs  Blood pressure 138/88, height 5' 5\" (1.651 m), weight (!) 351 lb (159.2 kg), not currently breastfeeding. NURSE: ANATOLIY    HPI: Patient here today with a couple issues. Patient has having fertility issues. Patient also would like to lose weight because she has gained a lot of weight since last summer. In review of her chart I found an abnormal ultrasound which related to then to a D&E.   The D&C showed ED atypical cells which patient was to be seeing a specialist.  Patient states her insurance did not cover the specialist.    Yes  PT denies fever, chills, nausea and vomiting           Results reviewed today:    I reviewed her surgical pathology and her last appointment with Dr. Trino Chauhan. I told patient I would discuss with Dr. Trino Chauhan plan of action and call her back today. Assessment and Plan          Diagnosis Orders   1. Complex endometrial hyperplasia with atypia  Zelda Welsh MD, Gynecologic Oncology, [facility]   2. PCOS (polycystic ovarian syndrome)  Pattie Friend MD, Gynecologic Oncology, [facility]       Note: I did speak with Dr. Trino Chauhan about the previous pathology and patient wishes. At this point we are going to talk to patient and do a referral to Dr. Ledy Andrew who is a gynecologic oncologist that has been since so long since she has had a period with endometrial atypia. Message on referral    Pt had D&E last November below are the results  Received: 11/7/2019   Reported: 11/8/2019 14:26     -- Diagnosis --   ENDOMETRIUM, CURETTINGS:        - POLYPOID COMPLEX ENDOMETRIAL HYPERPLASIA WITH ATYPIA AND         STROMAL SQUAMOUS AND OSSEOUS METAPLASIA.      - NO DEFINITIVE ENDOMETRIAL CARCINOMA IS IDENTIFIED IN THIS   SPECIMEN. Pt has not had a period since this procedure and is wanting pregnancy at some point. After reviewing case with Dr. Trino Chauhan her is worried about these cells turing cancerous during this time and wants a consult at your practice for management  And procedural plan    I did call pt back at 1311 and reviewed all this information with her and she is in agreement. If this referral does not work we will set up with Dr. Silverio Hinson am having Ibeth Solis maintain her acetaminophen, Multiple Vitamins-Minerals (MULTI FOR HER PO), lisinopril, metFORMIN, APPLE CIDER VINEGAR PO, sertraline, and labetalol. No follow-ups on file. She was also counseled on her preventative health maintenance recommendations and follow-up. There are no Patient Instructions on file for this visit.     Tra Shabazz 1:11 PM

## 2020-07-29 ENCOUNTER — OFFICE VISIT (OUTPATIENT)
Dept: GYNECOLOGIC ONCOLOGY | Age: 30
End: 2020-07-29
Payer: COMMERCIAL

## 2020-07-29 ENCOUNTER — HOSPITAL ENCOUNTER (OUTPATIENT)
Age: 30
Setting detail: SPECIMEN
Discharge: HOME | End: 2020-07-29
Payer: COMMERCIAL

## 2020-07-29 VITALS
WEIGHT: 293 LBS | HEIGHT: 65 IN | DIASTOLIC BLOOD PRESSURE: 106 MMHG | OXYGEN SATURATION: 96 % | SYSTOLIC BLOOD PRESSURE: 154 MMHG | BODY MASS INDEX: 48.82 KG/M2 | HEART RATE: 84 BPM

## 2020-07-29 PROCEDURE — 99204 OFFICE O/P NEW MOD 45 MIN: CPT | Performed by: PHYSICIAN ASSISTANT

## 2020-07-29 RX ORDER — HYDROXYZINE PAMOATE 25 MG/1
25 CAPSULE ORAL 2 TIMES DAILY PRN
COMMUNITY
Start: 2020-07-24

## 2020-07-29 RX ORDER — FUROSEMIDE 20 MG/1
TABLET ORAL
COMMUNITY
Start: 2020-07-24 | End: 2022-06-17 | Stop reason: ALTCHOICE

## 2020-07-29 ASSESSMENT — ENCOUNTER SYMPTOMS
EYES NEGATIVE: 1
RESPIRATORY NEGATIVE: 1
GASTROINTESTINAL NEGATIVE: 1

## 2020-07-29 NOTE — PATIENT INSTRUCTIONS
1. Return to clinic in 2 weeks for follow up  2. Plan to obtain pelvic ultrasound and  prior to visit  3. Will discuss pap smear results at follow up visit  4. Referral placed to reproductive endocrinology and bariatric management   5. Advised to follow up with PCP regarding anxiety state and elevated blood pressure.  Advised to recheck once home and notify PCP if continued elevation or symptoms arise

## 2020-07-29 NOTE — PROGRESS NOTES
Review of Systems   Constitutional: Negative. HENT:   Positive for lump/mass. Eyes: Negative. Respiratory: Negative. Cardiovascular: Positive for leg swelling. Gastrointestinal: Negative. Endocrine: Positive for hot flashes. Genitourinary: Positive for menstrual problem. Musculoskeletal: Negative. Skin: Negative. Neurological: Positive for headaches. Hematological: Negative. Psychiatric/Behavioral: The patient is nervous/anxious.

## 2020-07-29 NOTE — PROGRESS NOTES
[facility], [facility], [address] [address]      I am seeing Guera Bruce for New Patient at the request of Jaye SANDERS CNM    Chief Complaint:  Complex endometrial hyperplasia with atypia     HPI  She is a [de-identified] P0  31 y.o. female who is being referred for a diagnosis of complex endometrial hyperplasia with atypia noted on D&C specimen from 11/6/2019. She has a longstanding history of following with her local certified midwife, nurse practitioner Jaye Alexander for abnormal menstruation and infertility. A pelvic ultrasound on 3/7/2016, noted uterus was anteverted, with calcifications in the endometrium. Endometrial stripe was 1.1 cm. Bilateral ovaries noted multiple follicles arrangement, consistent with PCOS. She was placed on metformin and Provera at this time. She was then transitioned to a combination oral contraceptive pill, and continued to have abnormal menstrual cycles, and amenorrhea. She was counseled to return to clinic if she did not have a menstrual cycle at least every 3 months. Per the charting, the patient then returned in June 2019 to see Jaye Alexander. At this time she was attempting to achieve pregnancy, and had stopped the oral contraceptive pills. She did not have regular menses. She was then restarted on Provera, and had difficulties achieving a menstruation. Labs were obtained at this time, including FSH, LH, estradiol, progesterone, TSH and beta-hCG, prolactin, all were within normal range. Follow-up pelvic ultrasound in September 12 2019, revealed a \"homogeneous appearing uterus endometrium measuring 7 mm, bright calcifications within the endometrium. Both ovaries containing multi tiny cyst, again consistent with PCOS\". Based on these findings the patient was counseled to return for an endometrial biopsy. She was seen by Dr. Kalyani Fowler on 10/14/2019, and an endometrial biopsy was obtained.   Final pathology revealed atypical complex endometrial hyperplasia, endocervical polyp with microglandular hyperplasia, negative for high-grade dysplasia or malignancy. Therefore the patient was counseled to undergo a hysteroscopy D&C for further evaluation and diagnosis. She underwent hysteroscopy D&C on 11/6/2019, a large endometrial polyp was identified intraoperatively and resected with the MyoSure. Remainder of the endometrium did not have concerning features. Final pathology again revealed polypoid atypical complex endometrial hyperplasia, and stromal squamous and osseous metaplasia. \"No definitive endometrial carcinoma identified in the specimen\". At this time, the patient was continuing to desire fertility, therefore she was referred to a reproductive endocrinologist.  However the patient was not seen by the fertility specialist secondary to insurance coverage. She most recently presented to her nurse midwife Daphnie Connor again on July 15, 2020, with continued concerns for difficulty achieving pregnancy as well as increased weight gain. At this time, the patient was then counseled to be referred to [address] [place] [address] gynecologic oncology, given the history of complex atypical hyperplasia of the endometrium for further work-up and treatment. She states she underwent menses around age 7. Her LMP was November 2019. She states she had abnormal menstruation and amenorrhea since she was 11years old. Her last Pap smear was obtained in June 18, 2019, and was noted to be within normal limits. She denies a history of abnormal Pap smears. Medical history is consistent with obesity, and PCOS. She denies known history of heart, liver, lung, or kidney disease. Family history denied for any known uterine, ovarian, cervical, breast, or colon cancer. She does state she has [facility] family members with cancer\" however unable to discern the type. Surgical history consistent with hysteroscopy and D&C.     Today, she is deformities. Pelvic Exam: Small vaginal introitus, vaginal canal is long, the cervix is nulliparous. There is no blood in the vaginal canal. There is scant white discharge present. No odor. Screening pap smear was then obtained. No visible lesions noted. Bimanual examination was limited secondary to pt's habitus and long vaginal canal. The cervix was barley palpable, the uterus was noted to be mobile, does not feel to be enlarged. No adnexal pathology palpable. No vaginal nodules or firmness appreciated. Family History   Problem Relation Age of Onset    Cancer Mother     Cancer Father     Diabetes Maternal Grandmother     Heart Disease Maternal Grandmother     Cancer Paternal Grandmother     Cancer Paternal Grandfather        Assessment:  1. Complex endometrial hyperplasia with atypia    2. Encounter for Papanicolaou smear for cervical cancer screening    3. PCOS (polycystic ovarian syndrome)    4. Morbid obesity with BMI of 50.0-59.9, adult [facility])        Discussion: The patient and I then held a long discussion regarding her desires for fertility, as well as the current diagnosis of complex atypical hyperplasia. We discussed her diagnosis, and association with increased estrogen, which can be a direct correlation with the diagnosis of obesity And certainly polycystic ovarian syndrome as well. The patient is desiring weight loss management. She was planning to start Adipex (Phentermine) however this has not be initiated at this time. We did discuss the diagnosis of complex atypical hyperplasia, and the precursor for invasive carcinoma. Various treatment options are available for her condition, which include but are not limited to observation, hormonal therapy, versus surgical management with possible hysterectomy. However, most importantly we need to evaluate her desire fertility status macho timely manner and appropriate treatment recommendations can then be made.      At this time, recommendations include the patient repeat the pelvic ultrasound, since it has been almost a year since her last imaging. We will also plan to obtain a baseline Ca1 25 antigen level at this time. However this value can also be elevated due to non malignant conditions. We would consider repeating the endometrial biopsy as well at her next visit. We will refer to her the reproductive endocrinology department. Pt will need to discern if insurance covers this service. We will also refer her to the bariatric weight management department. The patient seems motivated for lifestyle changes. Plan:  1. Repeat pelvic/transvaginal ultrasound     2. Plan to obtain  antigen level    3. Screening pap smear taken today    4. Referral placed for fertility specialist and weight management    Follow Up:  1. Pt to follow up in 2 week after completion of ultrasound and lab work      2. Will notify PCP of elevated blood pressure readings and high level of anxiety to be treated as deemed appropriate. Pt plans to repeat blood pressure once she is settled at home and notify PCP or present to nearest ED if continued elevations or symptoms develop    I spent 45 minutes providing care to the patient and >50% of the time was spent counseling and conoordinating care, discussing the patient's current situation, reviewing her options, counseling and education her and answering her questions. All of the patient's pertinent images, labs and previous records and procedures were reviewed.     Electronically signed by Carolina Cruz PA-C on 7/29/20 at 9:40 AM EDT

## 2020-08-03 LAB — CYTOLOGY REPORT: NORMAL

## 2020-08-07 ENCOUNTER — HOSPITAL ENCOUNTER (OUTPATIENT)
Dept: ULTRASOUND IMAGING | Age: 30
Discharge: HOME | End: 2020-08-09
Payer: COMMERCIAL

## 2020-08-07 ENCOUNTER — HOSPITAL ENCOUNTER (OUTPATIENT)
Dept: LAB | Age: 30
Discharge: HOME | End: 2020-08-07
Payer: COMMERCIAL

## 2020-08-07 ENCOUNTER — HOSPITAL ENCOUNTER (OUTPATIENT)
Dept: NON INVASIVE DIAGNOSTICS | Age: 30
Discharge: HOME | End: 2020-08-07
Payer: COMMERCIAL

## 2020-08-07 LAB
CA 125: 6 U/ML
LV EF: 65 %
LVEF MODALITY: NORMAL

## 2020-08-07 PROCEDURE — 86304 IMMUNOASSAY TUMOR CA 125: CPT

## 2020-08-07 PROCEDURE — 76830 TRANSVAGINAL US NON-OB: CPT

## 2020-08-07 PROCEDURE — 36415 COLL VENOUS BLD VENIPUNCTURE: CPT

## 2020-08-07 PROCEDURE — 93306 TTE W/DOPPLER COMPLETE: CPT

## 2020-08-07 PROCEDURE — 76856 US EXAM PELVIC COMPLETE: CPT

## 2020-08-13 ENCOUNTER — OFFICE VISIT (OUTPATIENT)
Dept: GYNECOLOGIC ONCOLOGY | Age: 30
End: 2020-08-13
Payer: COMMERCIAL

## 2020-08-13 VITALS
TEMPERATURE: 97.4 F | DIASTOLIC BLOOD PRESSURE: 100 MMHG | WEIGHT: 293 LBS | BODY MASS INDEX: 48.82 KG/M2 | HEIGHT: 65 IN | HEART RATE: 87 BPM | SYSTOLIC BLOOD PRESSURE: 138 MMHG

## 2020-08-13 PROCEDURE — 99213 OFFICE O/P EST LOW 20 MIN: CPT | Performed by: OBSTETRICS & GYNECOLOGY

## 2020-08-13 NOTE — PROGRESS NOTES
Review of Systems   HENT:   Positive for lump/mass (rt armpit cyst). Hematological: Bruises/bleeds easily.

## 2020-08-13 NOTE — PROGRESS NOTES
[address] [facility], [facility], [address] [address]      I am seeing Tiffany De León for New Patient at the request of Dr. Jeremy Smith, OB/GYN, [facility]. Chief Complaint:  Edometrial hyperplasia with atypia    HPI  She is a 31 y.o.  0, para 0 female who is being referred for endometrial hyperplasia with atypia. The patient has had a lifelong history of obesity. She is always had irregular menses. She had been seeing nurse practitioner Donna García in [facility] for abnormal menstruation and infertility. A previous ultrasound on 3/7/2016 revealed bilateral ovaries had multiple follicles and an arrangement consistent with polycystic ovarian syndrome (PCOS). The patient was placed on metformin and Provera and eventually an oral BCP. She had infrequent but abnormal menses and eventually amenorrhea. This was discontinued in an attempt to become pregnant in . She did not have regular menstruation and did not become pregnant. She was restarted on the Provera. All of her infertility work-up labs were within normal limits. An ultrasound in 2018 revealed a 7 mm endometrial stripe with some bright calcifications. Polycystic ovaries were again noted. She saw Dr. Trino Chauhan 2019 and an endometrial biopsy was performed that revealed \"atypical complex endometrial hyperplasia\". She was therefore scheduled for hysteroscopy and D&C. This was performed 2019 and a large endometrial polyp was removed. The remainder of the endometrium appeared normal.    Final pathology revealed \"polypoid atypical complex endometrial hyperplasia\". The patient was referred to reproductive endocrinologist.  Her insurance denied coverage. She was seen most recently by Donna García, nurse practitioner on July 15, 2020 with infertility and weight gain.     She has been referred on to [facility] gynecologic oncology because of her obtaining a pelvic MRI to make sure she does not have any evidence of invasive disease. I have recommended that she have a repeat D&C and placement of a Mirena, progesterone emitting IUD. We would then follow her with endometrial biopsies every 3 to 4 months until she has had several negative biopsies. During this time the patient should aggressively lose weight and become prepared for infertility treatments. Following 3- biopsies the patient's Mirena IUD can be removed and she can proceed to achieve or carry a pregnancy. Follow-up: Postoperatively      INFORMED CONSENT:  We discussed options including but not limited to observation and surgery. The D&C and placement of the Mirena IUD was recommended and the patient agrees. Benefits of the procedure(s) include but not limited to treatment, possible staging of precancerous/cancerous lesions and/or improvement in systems and quality of life. The procedure(s) were explained in great detail along with all the possible risks and complications including, but not limited to blood loss requiring transfusions, DVT requiring blood thinning agents, injury to surrounding structures including bladder, bowel ureters, etc., as well as the possibility of infection requiring prophylactic antibiotics. I spent 15 minutes providing care to the patient and >50% of the time was spent counseling and conoordinating care, discussing the patient's current situation, reviewing her options, counseling and education her and answering her questions. All of the patient's pertinent images, labs and previous records and procedures were reviewed.     Electronically signed by María Mcmanus MD on 8/13/20 at 4:41 PM EDT

## 2020-08-14 ENCOUNTER — PREP FOR PROCEDURE (OUTPATIENT)
Dept: GYNECOLOGIC ONCOLOGY | Age: 30
End: 2020-08-14

## 2020-08-14 RX ORDER — SODIUM CHLORIDE 0.9 % (FLUSH) 0.9 %
10 SYRINGE (ML) INJECTION PRN
Status: CANCELLED | OUTPATIENT
Start: 2020-08-14

## 2020-08-14 RX ORDER — SODIUM CHLORIDE 9 MG/ML
INJECTION, SOLUTION INTRAVENOUS CONTINUOUS
Status: CANCELLED | OUTPATIENT
Start: 2020-08-14

## 2020-08-14 RX ORDER — ONDANSETRON 2 MG/ML
4 INJECTION INTRAMUSCULAR; INTRAVENOUS ONCE
Status: CANCELLED | OUTPATIENT
Start: 2020-08-14 | End: 2020-08-14

## 2020-08-14 RX ORDER — SODIUM CHLORIDE 0.9 % (FLUSH) 0.9 %
10 SYRINGE (ML) INJECTION EVERY 12 HOURS SCHEDULED
Status: CANCELLED | OUTPATIENT
Start: 2020-08-14

## 2020-08-14 RX ORDER — ACETAMINOPHEN 500 MG
1000 TABLET ORAL ONCE
Status: CANCELLED | OUTPATIENT
Start: 2020-08-14 | End: 2020-08-14

## 2020-08-14 RX ORDER — DEXAMETHASONE 4 MG/1
4 TABLET ORAL ONCE
Status: CANCELLED | OUTPATIENT
Start: 2020-08-14 | End: 2020-08-14

## 2020-08-17 ENCOUNTER — TELEPHONE (OUTPATIENT)
Dept: GYNECOLOGIC ONCOLOGY | Age: 30
End: 2020-08-17

## 2020-08-17 NOTE — TELEPHONE ENCOUNTER
Writer notified patient that surgery is scheduled at [facility] on Tuesday, September 8 at 12:10p; arrive at 10am.   PAT is scheduled on Wednesday, August 26 at 2:00 at [address] will contact her to schedule the COVID test at their site. Post-op visit in office scheduled on Thursday, September 17 at 3 pm.     Patient verbalized understanding and thanked writer.

## 2020-08-25 ENCOUNTER — HOSPITAL ENCOUNTER (OUTPATIENT)
Dept: MRI IMAGING | Age: 30
Discharge: HOME | End: 2020-08-27
Payer: COMMERCIAL

## 2020-08-25 PROCEDURE — 6360000004 HC RX CONTRAST MEDICATION: Performed by: OBSTETRICS & GYNECOLOGY

## 2020-08-25 PROCEDURE — A9579 GAD-BASE MR CONTRAST NOS,1ML: HCPCS | Performed by: OBSTETRICS & GYNECOLOGY

## 2020-08-25 PROCEDURE — 72197 MRI PELVIS W/O & W/DYE: CPT

## 2020-08-25 RX ADMIN — GADOTERIDOL 20 ML: 279.3 INJECTION, SOLUTION INTRAVENOUS at 14:32

## 2020-08-26 ENCOUNTER — HOSPITAL ENCOUNTER (OUTPATIENT)
Dept: GENERAL RADIOLOGY | Age: 30
Discharge: HOME | End: 2020-08-28
Payer: COMMERCIAL

## 2020-08-26 ENCOUNTER — HOSPITAL ENCOUNTER (OUTPATIENT)
Dept: PREADMISSION TESTING | Age: 30
Discharge: HOME | End: 2020-08-30
Payer: COMMERCIAL

## 2020-08-26 VITALS
HEART RATE: 86 BPM | TEMPERATURE: 98.5 F | RESPIRATION RATE: 20 BRPM | SYSTOLIC BLOOD PRESSURE: 150 MMHG | HEIGHT: 65 IN | BODY MASS INDEX: 48.82 KG/M2 | DIASTOLIC BLOOD PRESSURE: 100 MMHG | WEIGHT: 293 LBS

## 2020-08-26 LAB
ABSOLUTE EOS #: 0.37 K/UL (ref 0–0.44)
ABSOLUTE IMMATURE GRANULOCYTE: 0.03 K/UL (ref 0–0.3)
ABSOLUTE LYMPH #: 2.22 K/UL (ref 1.1–3.7)
ABSOLUTE MONO #: 0.66 K/UL (ref 0.1–1.2)
ALP BLD-CCNC: 55 U/L (ref 35–104)
ALT SERPL-CCNC: 80 U/L (ref 5–33)
ANION GAP SERPL CALCULATED.3IONS-SCNC: 12 MMOL/L (ref 9–17)
BASOPHILS # BLD: 1 % (ref 0–2)
BASOPHILS ABSOLUTE: 0.07 K/UL (ref 0–0.2)
BUN BLDV-MCNC: 12 MG/DL (ref 6–20)
BUN/CREAT BLD: ABNORMAL (ref 9–20)
CALCIUM SERPL-MCNC: 9.6 MG/DL (ref 8.6–10.4)
CHLORIDE BLD-SCNC: 99 MMOL/L (ref 98–107)
CO2: 28 MMOL/L (ref 20–31)
CREAT SERPL-MCNC: 0.93 MG/DL (ref 0.5–0.9)
DIFFERENTIAL TYPE: ABNORMAL
EOSINOPHILS RELATIVE PERCENT: 4 % (ref 1–4)
GFR AFRICAN AMERICAN: >60 ML/MIN
GFR NON-AFRICAN AMERICAN: >60 ML/MIN
GFR SERPL CREATININE-BSD FRML MDRD: ABNORMAL ML/MIN/{1.73_M2}
GFR SERPL CREATININE-BSD FRML MDRD: ABNORMAL ML/MIN/{1.73_M2}
GLUCOSE BLD-MCNC: 99 MG/DL (ref 70–99)
HCG QUALITATIVE: NEGATIVE
HCT VFR BLD CALC: 45.2 % (ref 36.3–47.1)
HEMOGLOBIN: 14.1 G/DL (ref 11.9–15.1)
IMMATURE GRANULOCYTES: 0 %
LYMPHOCYTES # BLD: 22 % (ref 24–43)
MCH RBC QN AUTO: 25.7 PG (ref 25.2–33.5)
MCHC RBC AUTO-ENTMCNC: 31.2 G/DL (ref 28.4–34.8)
MCV RBC AUTO: 82.5 FL (ref 82.6–102.9)
MONOCYTES # BLD: 7 % (ref 3–12)
NRBC AUTOMATED: 0 PER 100 WBC
PDW BLD-RTO: 13.2 % (ref 11.8–14.4)
PLATELET # BLD: 319 K/UL (ref 138–453)
PLATELET ESTIMATE: ABNORMAL
PMV BLD AUTO: 11.4 FL (ref 8.1–13.5)
POTASSIUM SERPL-SCNC: 4.1 MMOL/L (ref 3.7–5.3)
RBC # BLD: 5.48 M/UL (ref 3.95–5.11)
RBC # BLD: ABNORMAL 10*6/UL
SEG NEUTROPHILS: 66 % (ref 36–65)
SEGMENTED NEUTROPHILS ABSOLUTE COUNT: 6.58 K/UL (ref 1.5–8.1)
SODIUM BLD-SCNC: 139 MMOL/L (ref 135–144)
WBC # BLD: 9.9 K/UL (ref 3.5–11.3)
WBC # BLD: ABNORMAL 10*3/UL

## 2020-08-26 PROCEDURE — 71046 X-RAY EXAM CHEST 2 VIEWS: CPT

## 2020-08-26 PROCEDURE — 80048 BASIC METABOLIC PNL TOTAL CA: CPT

## 2020-08-26 PROCEDURE — 93005 ELECTROCARDIOGRAM TRACING: CPT | Performed by: OBSTETRICS & GYNECOLOGY

## 2020-08-26 PROCEDURE — 84460 ALANINE AMINO (ALT) (SGPT): CPT

## 2020-08-26 PROCEDURE — 84075 ASSAY ALKALINE PHOSPHATASE: CPT

## 2020-08-26 PROCEDURE — 84703 CHORIONIC GONADOTROPIN ASSAY: CPT

## 2020-08-26 PROCEDURE — 36415 COLL VENOUS BLD VENIPUNCTURE: CPT

## 2020-08-26 PROCEDURE — 85025 COMPLETE CBC W/AUTO DIFF WBC: CPT

## 2020-08-26 RX ORDER — SODIUM CHLORIDE, SODIUM LACTATE, POTASSIUM CHLORIDE, CALCIUM CHLORIDE 600; 310; 30; 20 MG/100ML; MG/100ML; MG/100ML; MG/100ML
1000 INJECTION, SOLUTION INTRAVENOUS CONTINUOUS
Status: CANCELLED | OUTPATIENT
Start: 2020-08-26

## 2020-08-26 RX ORDER — IBUPROFEN 200 MG
200 TABLET ORAL EVERY 6 HOURS PRN
Status: ON HOLD | COMMUNITY
End: 2022-06-20 | Stop reason: HOSPADM

## 2020-08-26 NOTE — ANESTHESIA PRE-OP
No    Medical or cardiac clearance ordered:                                         No    Anesthesiologist called:                                                                No    CHRISTINE Sutton PA-C  Electronically signed 8/26/2020 at 2:31 PM

## 2020-08-27 LAB
EKG ATRIAL RATE: 64 BPM
EKG P AXIS: 20 DEGREES
EKG P-R INTERVAL: 148 MS
EKG Q-T INTERVAL: 412 MS
EKG QRS DURATION: 86 MS
EKG QTC CALCULATION (BAZETT): 425 MS
EKG R AXIS: -8 DEGREES
EKG T AXIS: 11 DEGREES
EKG VENTRICULAR RATE: 64 BPM

## 2020-08-27 PROCEDURE — 93010 ELECTROCARDIOGRAM REPORT: CPT | Performed by: INTERNAL MEDICINE

## 2020-09-04 ENCOUNTER — HOSPITAL ENCOUNTER (OUTPATIENT)
Dept: PREADMISSION TESTING | Age: 30
Setting detail: SPECIMEN
Discharge: HOME | End: 2020-09-08
Payer: COMMERCIAL

## 2020-09-04 DIAGNOSIS — Z01.818 PREOP TESTING: Primary | ICD-10-CM

## 2020-09-04 PROCEDURE — U0003 INFECTIOUS AGENT DETECTION BY NUCLEIC ACID (DNA OR RNA); SEVERE ACUTE RESPIRATORY SYNDROME CORONAVIRUS 2 (SARS-COV-2) (CORONAVIRUS DISEASE [COVID-19]), AMPLIFIED PROBE TECHNIQUE, MAKING USE OF HIGH THROUGHPUT TECHNOLOGIES AS DESCRIBED BY CMS-2020-01-R: HCPCS

## 2020-09-04 PROCEDURE — C9803 HOPD COVID-19 SPEC COLLECT: HCPCS

## 2020-09-06 LAB — SARS-COV-2, NAA: NOT DETECTED

## 2020-09-07 ENCOUNTER — ANESTHESIA EVENT (OUTPATIENT)
Dept: OPERATING ROOM | Age: 30
End: 2020-09-07
Payer: COMMERCIAL

## 2020-09-08 ENCOUNTER — HOSPITAL ENCOUNTER (OUTPATIENT)
Age: 30
Setting detail: OUTPATIENT SURGERY
Discharge: HOME | End: 2020-09-08
Attending: OBSTETRICS & GYNECOLOGY | Admitting: OBSTETRICS & GYNECOLOGY
Payer: COMMERCIAL

## 2020-09-08 ENCOUNTER — ANESTHESIA (OUTPATIENT)
Dept: OPERATING ROOM | Age: 30
End: 2020-09-08
Payer: COMMERCIAL

## 2020-09-08 VITALS
RESPIRATION RATE: 18 BRPM | TEMPERATURE: 96.8 F | WEIGHT: 293 LBS | DIASTOLIC BLOOD PRESSURE: 85 MMHG | HEART RATE: 78 BPM | BODY MASS INDEX: 48.82 KG/M2 | SYSTOLIC BLOOD PRESSURE: 145 MMHG | OXYGEN SATURATION: 94 % | HEIGHT: 65 IN

## 2020-09-08 VITALS — OXYGEN SATURATION: 91 % | DIASTOLIC BLOOD PRESSURE: 86 MMHG | SYSTOLIC BLOOD PRESSURE: 135 MMHG | TEMPERATURE: 98.2 F

## 2020-09-08 PROBLEM — Z98.890 POSTOPERATIVE STATE: Status: ACTIVE | Noted: 2020-09-08

## 2020-09-08 LAB
ABSOLUTE EOS #: 0.38 K/UL (ref 0–0.44)
ABSOLUTE IMMATURE GRANULOCYTE: 0.08 K/UL (ref 0–0.3)
ABSOLUTE LYMPH #: 2.14 K/UL (ref 1.1–3.7)
ABSOLUTE MONO #: 0.61 K/UL (ref 0.1–1.2)
ANION GAP SERPL CALCULATED.3IONS-SCNC: 12 MMOL/L (ref 9–17)
BASOPHILS # BLD: 1 % (ref 0–2)
BASOPHILS ABSOLUTE: 0.05 K/UL (ref 0–0.2)
BUN BLDV-MCNC: 11 MG/DL (ref 6–20)
BUN/CREAT BLD: ABNORMAL (ref 9–20)
CALCIUM SERPL-MCNC: 9.1 MG/DL (ref 8.6–10.4)
CHLORIDE BLD-SCNC: 103 MMOL/L (ref 98–107)
CO2: 23 MMOL/L (ref 20–31)
CREAT SERPL-MCNC: 0.69 MG/DL (ref 0.5–0.9)
DIFFERENTIAL TYPE: ABNORMAL
EOSINOPHILS RELATIVE PERCENT: 4 % (ref 1–4)
GFR AFRICAN AMERICAN: >60 ML/MIN
GFR NON-AFRICAN AMERICAN: >60 ML/MIN
GFR SERPL CREATININE-BSD FRML MDRD: ABNORMAL ML/MIN/{1.73_M2}
GFR SERPL CREATININE-BSD FRML MDRD: ABNORMAL ML/MIN/{1.73_M2}
GLUCOSE BLD-MCNC: 126 MG/DL (ref 70–99)
HCG QUALITATIVE: NEGATIVE
HCT VFR BLD CALC: 42.6 % (ref 36.3–47.1)
HEMOGLOBIN: 13.5 G/DL (ref 11.9–15.1)
IMMATURE GRANULOCYTES: 1 %
LYMPHOCYTES # BLD: 24 % (ref 24–43)
MCH RBC QN AUTO: 26.5 PG (ref 25.2–33.5)
MCHC RBC AUTO-ENTMCNC: 31.7 G/DL (ref 28.4–34.8)
MCV RBC AUTO: 83.7 FL (ref 82.6–102.9)
MONOCYTES # BLD: 7 % (ref 3–12)
NRBC AUTOMATED: 0 PER 100 WBC
PDW BLD-RTO: 13.4 % (ref 11.8–14.4)
PLATELET # BLD: 272 K/UL (ref 138–453)
PLATELET ESTIMATE: ABNORMAL
PMV BLD AUTO: 10.9 FL (ref 8.1–13.5)
POTASSIUM SERPL-SCNC: 4.3 MMOL/L (ref 3.7–5.3)
RBC # BLD: 5.09 M/UL (ref 3.95–5.11)
RBC # BLD: ABNORMAL 10*6/UL
SEG NEUTROPHILS: 63 % (ref 36–65)
SEGMENTED NEUTROPHILS ABSOLUTE COUNT: 5.82 K/UL (ref 1.5–8.1)
SODIUM BLD-SCNC: 138 MMOL/L (ref 135–144)
WBC # BLD: 9.1 K/UL (ref 3.5–11.3)
WBC # BLD: ABNORMAL 10*3/UL

## 2020-09-08 PROCEDURE — 7100000011 HC PHASE II RECOVERY - ADDTL 15 MIN: Performed by: OBSTETRICS & GYNECOLOGY

## 2020-09-08 PROCEDURE — 6360000002 HC RX W HCPCS: Performed by: ANESTHESIOLOGY

## 2020-09-08 PROCEDURE — 2500000003 HC RX 250 WO HCPCS: Performed by: NURSE ANESTHETIST, CERTIFIED REGISTERED

## 2020-09-08 PROCEDURE — 58300 INSERT INTRAUTERINE DEVICE: CPT | Performed by: OBSTETRICS & GYNECOLOGY

## 2020-09-08 PROCEDURE — 85025 COMPLETE CBC W/AUTO DIFF WBC: CPT

## 2020-09-08 PROCEDURE — 7100000001 HC PACU RECOVERY - ADDTL 15 MIN: Performed by: OBSTETRICS & GYNECOLOGY

## 2020-09-08 PROCEDURE — 80048 BASIC METABOLIC PNL TOTAL CA: CPT

## 2020-09-08 PROCEDURE — 3600000002 HC SURGERY LEVEL 2 BASE: Performed by: OBSTETRICS & GYNECOLOGY

## 2020-09-08 PROCEDURE — 3600000012 HC SURGERY LEVEL 2 ADDTL 15MIN: Performed by: OBSTETRICS & GYNECOLOGY

## 2020-09-08 PROCEDURE — 6360000002 HC RX W HCPCS: Performed by: NURSE ANESTHETIST, CERTIFIED REGISTERED

## 2020-09-08 PROCEDURE — 88305 TISSUE EXAM BY PATHOLOGIST: CPT

## 2020-09-08 PROCEDURE — 2580000003 HC RX 258: Performed by: ANESTHESIOLOGY

## 2020-09-08 PROCEDURE — 6360000002 HC RX W HCPCS: Performed by: PHYSICIAN ASSISTANT

## 2020-09-08 PROCEDURE — 2580000003 HC RX 258: Performed by: OBSTETRICS & GYNECOLOGY

## 2020-09-08 PROCEDURE — 3700000001 HC ADD 15 MINUTES (ANESTHESIA): Performed by: OBSTETRICS & GYNECOLOGY

## 2020-09-08 PROCEDURE — 58120 DILATION AND CURETTAGE: CPT | Performed by: OBSTETRICS & GYNECOLOGY

## 2020-09-08 PROCEDURE — 6370000000 HC RX 637 (ALT 250 FOR IP): Performed by: PHYSICIAN ASSISTANT

## 2020-09-08 PROCEDURE — 7100000010 HC PHASE II RECOVERY - FIRST 15 MIN: Performed by: OBSTETRICS & GYNECOLOGY

## 2020-09-08 PROCEDURE — 2580000003 HC RX 258: Performed by: NURSE ANESTHETIST, CERTIFIED REGISTERED

## 2020-09-08 PROCEDURE — 2709999900 HC NON-CHARGEABLE SUPPLY: Performed by: OBSTETRICS & GYNECOLOGY

## 2020-09-08 PROCEDURE — 84703 CHORIONIC GONADOTROPIN ASSAY: CPT

## 2020-09-08 PROCEDURE — 3700000000 HC ANESTHESIA ATTENDED CARE: Performed by: OBSTETRICS & GYNECOLOGY

## 2020-09-08 PROCEDURE — 7100000000 HC PACU RECOVERY - FIRST 15 MIN: Performed by: OBSTETRICS & GYNECOLOGY

## 2020-09-08 DEVICE — DEVICE INTRAUTERNE CONTRACEPTIVE MIRENA: Type: IMPLANTABLE DEVICE | Status: FUNCTIONAL

## 2020-09-08 RX ORDER — SODIUM CHLORIDE 0.9 % (FLUSH) 0.9 %
10 SYRINGE (ML) INJECTION EVERY 12 HOURS SCHEDULED
Status: DISCONTINUED | OUTPATIENT
Start: 2020-09-08 | End: 2020-09-08 | Stop reason: HOSPADM

## 2020-09-08 RX ORDER — MAGNESIUM HYDROXIDE 1200 MG/15ML
LIQUID ORAL PRN
Status: DISCONTINUED | OUTPATIENT
Start: 2020-09-08 | End: 2020-09-08 | Stop reason: ALTCHOICE

## 2020-09-08 RX ORDER — ONDANSETRON 2 MG/ML
4 INJECTION INTRAMUSCULAR; INTRAVENOUS
Status: DISCONTINUED | OUTPATIENT
Start: 2020-09-08 | End: 2020-09-08 | Stop reason: HOSPADM

## 2020-09-08 RX ORDER — MAGNESIUM HYDROXIDE 1200 MG/15ML
LIQUID ORAL CONTINUOUS PRN
Status: COMPLETED | OUTPATIENT
Start: 2020-09-08 | End: 2020-09-08

## 2020-09-08 RX ORDER — FENTANYL CITRATE 50 UG/ML
INJECTION, SOLUTION INTRAMUSCULAR; INTRAVENOUS PRN
Status: DISCONTINUED | OUTPATIENT
Start: 2020-09-08 | End: 2020-09-08 | Stop reason: SDUPTHER

## 2020-09-08 RX ORDER — DEXAMETHASONE SODIUM PHOSPHATE 4 MG/ML
INJECTION, SOLUTION INTRA-ARTICULAR; INTRALESIONAL; INTRAMUSCULAR; INTRAVENOUS; SOFT TISSUE PRN
Status: DISCONTINUED | OUTPATIENT
Start: 2020-09-08 | End: 2020-09-08 | Stop reason: SDUPTHER

## 2020-09-08 RX ORDER — ACETAMINOPHEN 500 MG
1000 TABLET ORAL ONCE
Status: COMPLETED | OUTPATIENT
Start: 2020-09-08 | End: 2020-09-08

## 2020-09-08 RX ORDER — SODIUM CHLORIDE, SODIUM LACTATE, POTASSIUM CHLORIDE, CALCIUM CHLORIDE 600; 310; 30; 20 MG/100ML; MG/100ML; MG/100ML; MG/100ML
1000 INJECTION, SOLUTION INTRAVENOUS CONTINUOUS
Status: DISCONTINUED | OUTPATIENT
Start: 2020-09-08 | End: 2020-09-08 | Stop reason: HOSPADM

## 2020-09-08 RX ORDER — KETOROLAC TROMETHAMINE 30 MG/ML
INJECTION, SOLUTION INTRAMUSCULAR; INTRAVENOUS PRN
Status: DISCONTINUED | OUTPATIENT
Start: 2020-09-08 | End: 2020-09-08 | Stop reason: SDUPTHER

## 2020-09-08 RX ORDER — LIDOCAINE HYDROCHLORIDE 10 MG/ML
INJECTION, SOLUTION EPIDURAL; INFILTRATION; INTRACAUDAL; PERINEURAL PRN
Status: DISCONTINUED | OUTPATIENT
Start: 2020-09-08 | End: 2020-09-08 | Stop reason: SDUPTHER

## 2020-09-08 RX ORDER — FENTANYL CITRATE 50 UG/ML
50 INJECTION, SOLUTION INTRAMUSCULAR; INTRAVENOUS EVERY 5 MIN PRN
Status: DISCONTINUED | OUTPATIENT
Start: 2020-09-08 | End: 2020-09-08 | Stop reason: HOSPADM

## 2020-09-08 RX ORDER — MIDAZOLAM HYDROCHLORIDE 1 MG/ML
2 INJECTION INTRAMUSCULAR; INTRAVENOUS
Status: DISCONTINUED | OUTPATIENT
Start: 2020-09-08 | End: 2020-09-08 | Stop reason: HOSPADM

## 2020-09-08 RX ORDER — SODIUM CHLORIDE 9 MG/ML
INJECTION, SOLUTION INTRAVENOUS CONTINUOUS
Status: DISCONTINUED | OUTPATIENT
Start: 2020-09-08 | End: 2020-09-08 | Stop reason: HOSPADM

## 2020-09-08 RX ORDER — FENTANYL CITRATE 50 UG/ML
25 INJECTION, SOLUTION INTRAMUSCULAR; INTRAVENOUS ONCE
Status: DISCONTINUED | OUTPATIENT
Start: 2020-09-08 | End: 2020-09-08 | Stop reason: HOSPADM

## 2020-09-08 RX ORDER — SODIUM CHLORIDE 0.9 % (FLUSH) 0.9 %
10 SYRINGE (ML) INJECTION PRN
Status: DISCONTINUED | OUTPATIENT
Start: 2020-09-08 | End: 2020-09-08 | Stop reason: HOSPADM

## 2020-09-08 RX ORDER — ONDANSETRON 2 MG/ML
4 INJECTION INTRAMUSCULAR; INTRAVENOUS ONCE
Status: DISCONTINUED | OUTPATIENT
Start: 2020-09-08 | End: 2020-09-08 | Stop reason: HOSPADM

## 2020-09-08 RX ORDER — ONDANSETRON 2 MG/ML
INJECTION INTRAMUSCULAR; INTRAVENOUS PRN
Status: DISCONTINUED | OUTPATIENT
Start: 2020-09-08 | End: 2020-09-08 | Stop reason: SDUPTHER

## 2020-09-08 RX ORDER — ONDANSETRON 2 MG/ML
4 INJECTION INTRAMUSCULAR; INTRAVENOUS ONCE
Status: COMPLETED | OUTPATIENT
Start: 2020-09-08 | End: 2020-09-08

## 2020-09-08 RX ORDER — DEXAMETHASONE 4 MG/1
4 TABLET ORAL ONCE
Status: COMPLETED | OUTPATIENT
Start: 2020-09-08 | End: 2020-09-08

## 2020-09-08 RX ORDER — DIPHENHYDRAMINE HYDROCHLORIDE 50 MG/ML
INJECTION INTRAMUSCULAR; INTRAVENOUS PRN
Status: DISCONTINUED | OUTPATIENT
Start: 2020-09-08 | End: 2020-09-08 | Stop reason: SDUPTHER

## 2020-09-08 RX ORDER — SODIUM CHLORIDE, SODIUM LACTATE, POTASSIUM CHLORIDE, CALCIUM CHLORIDE 600; 310; 30; 20 MG/100ML; MG/100ML; MG/100ML; MG/100ML
INJECTION, SOLUTION INTRAVENOUS CONTINUOUS
Status: DISCONTINUED | OUTPATIENT
Start: 2020-09-08 | End: 2020-09-08 | Stop reason: HOSPADM

## 2020-09-08 RX ORDER — SODIUM CHLORIDE, SODIUM LACTATE, POTASSIUM CHLORIDE, CALCIUM CHLORIDE 600; 310; 30; 20 MG/100ML; MG/100ML; MG/100ML; MG/100ML
INJECTION, SOLUTION INTRAVENOUS CONTINUOUS PRN
Status: DISCONTINUED | OUTPATIENT
Start: 2020-09-08 | End: 2020-09-08 | Stop reason: SDUPTHER

## 2020-09-08 RX ORDER — SUCCINYLCHOLINE/SOD CL,ISO/PF 100 MG/5ML
SYRINGE (ML) INTRAVENOUS PRN
Status: DISCONTINUED | OUTPATIENT
Start: 2020-09-08 | End: 2020-09-08 | Stop reason: SDUPTHER

## 2020-09-08 RX ORDER — PROPOFOL 10 MG/ML
INJECTION, EMULSION INTRAVENOUS PRN
Status: DISCONTINUED | OUTPATIENT
Start: 2020-09-08 | End: 2020-09-08 | Stop reason: SDUPTHER

## 2020-09-08 RX ADMIN — Medication 200 MG: at 13:43

## 2020-09-08 RX ADMIN — PROPOFOL 200 MG: 10 INJECTION, EMULSION INTRAVENOUS at 13:43

## 2020-09-08 RX ADMIN — SODIUM CHLORIDE, POTASSIUM CHLORIDE, SODIUM LACTATE AND CALCIUM CHLORIDE: 600; 310; 30; 20 INJECTION, SOLUTION INTRAVENOUS at 13:38

## 2020-09-08 RX ADMIN — DEXAMETHASONE SODIUM PHOSPHATE 8 MG: 4 INJECTION, SOLUTION INTRAMUSCULAR; INTRAVENOUS at 13:48

## 2020-09-08 RX ADMIN — PROPOFOL 100 MG: 10 INJECTION, EMULSION INTRAVENOUS at 13:44

## 2020-09-08 RX ADMIN — Medication 12.5 MG: at 13:48

## 2020-09-08 RX ADMIN — ONDANSETRON 4 MG: 2 INJECTION, SOLUTION INTRAMUSCULAR; INTRAVENOUS at 14:10

## 2020-09-08 RX ADMIN — DEXAMETHASONE 4 MG: 4 TABLET ORAL at 10:23

## 2020-09-08 RX ADMIN — SODIUM CHLORIDE, POTASSIUM CHLORIDE, SODIUM LACTATE AND CALCIUM CHLORIDE 1000 ML: 600; 310; 30; 20 INJECTION, SOLUTION INTRAVENOUS at 10:17

## 2020-09-08 RX ADMIN — ACETAMINOPHEN 1000 MG: 500 TABLET ORAL at 10:23

## 2020-09-08 RX ADMIN — FENTANYL CITRATE 50 MCG: 50 INJECTION, SOLUTION INTRAMUSCULAR; INTRAVENOUS at 14:30

## 2020-09-08 RX ADMIN — ONDANSETRON 4 MG: 2 INJECTION INTRAMUSCULAR; INTRAVENOUS at 10:23

## 2020-09-08 RX ADMIN — FENTANYL CITRATE 100 MCG: 50 INJECTION INTRAMUSCULAR; INTRAVENOUS at 13:43

## 2020-09-08 RX ADMIN — LIDOCAINE HYDROCHLORIDE 50 MG: 10 INJECTION, SOLUTION EPIDURAL; INFILTRATION; INTRACAUDAL; PERINEURAL at 13:43

## 2020-09-08 RX ADMIN — KETOROLAC TROMETHAMINE 30 MG: 30 INJECTION, SOLUTION INTRAMUSCULAR; INTRAVENOUS at 14:10

## 2020-09-08 ASSESSMENT — PULMONARY FUNCTION TESTS
PIF_VALUE: 30
PIF_VALUE: 31
PIF_VALUE: 31
PIF_VALUE: 2
PIF_VALUE: 1
PIF_VALUE: 35
PIF_VALUE: 7
PIF_VALUE: 1
PIF_VALUE: 36
PIF_VALUE: 31
PIF_VALUE: 30
PIF_VALUE: 2
PIF_VALUE: 31
PIF_VALUE: 3
PIF_VALUE: 1
PIF_VALUE: 35
PIF_VALUE: 31
PIF_VALUE: 1
PIF_VALUE: 33
PIF_VALUE: 36
PIF_VALUE: 30
PIF_VALUE: 30
PIF_VALUE: 37
PIF_VALUE: 29
PIF_VALUE: 37
PIF_VALUE: 34
PIF_VALUE: 3
PIF_VALUE: 34
PIF_VALUE: 31
PIF_VALUE: 34
PIF_VALUE: 4
PIF_VALUE: 37
PIF_VALUE: 21
PIF_VALUE: 2
PIF_VALUE: 30
PIF_VALUE: 30
PIF_VALUE: 1
PIF_VALUE: 34
PIF_VALUE: 35

## 2020-09-08 ASSESSMENT — PAIN DESCRIPTION - LOCATION: LOCATION: ABDOMEN

## 2020-09-08 ASSESSMENT — LIFESTYLE VARIABLES: SMOKING_STATUS: 0

## 2020-09-08 ASSESSMENT — PAIN DESCRIPTION - PAIN TYPE: TYPE: SURGICAL PAIN

## 2020-09-08 ASSESSMENT — PAIN SCALES - GENERAL
PAINLEVEL_OUTOF10: 7
PAINLEVEL_OUTOF10: 0

## 2020-09-08 ASSESSMENT — PAIN DESCRIPTION - DESCRIPTORS: DESCRIPTORS: ACHING;CRAMPING

## 2020-09-08 ASSESSMENT — PAIN - FUNCTIONAL ASSESSMENT: PAIN_FUNCTIONAL_ASSESSMENT: 0-10

## 2020-09-08 NOTE — ANESTHESIA POSTPROCEDURE EVALUATION
Department of Anesthesiology  Postprocedure Note    Patient: Dagoberto Andersen  MRN: [id number]  [place]: 1990  Date of evaluation: 9/8/2020  Time:  2:48 PM     Procedure Summary     Date:  09/08/20 Room / Location:  [address]    Anesthesia Start:  6163 Anesthesia Stop:  8917    Procedure:  DILATATION AND CURETTAGE, MIRENA PLACEMENT (N/A ) Diagnosis:  (ATYPICAL ENDOMETRIAL HYPERPLASIA)    Surgeon:  Meek Bah MD Responsible Provider:  Treva Mock MD    Anesthesia Type:  general ASA Status:  3          Anesthesia Type: general    Rebecca Phase I: Rebecca Score: 8    Rebecca Phase II:      Last vitals: Reviewed and per EMR flowsheets.        Anesthesia Post Evaluation    Patient location during evaluation: PACU  Patient participation: complete - patient participated  Level of consciousness: awake  Pain score: 3  Airway patency: patent  Nausea & Vomiting: no vomiting and no nausea  Complications: no  Cardiovascular status: blood pressure returned to baseline  Respiratory status: acceptable  Hydration status: euvolemic

## 2020-09-08 NOTE — H&P
OB/GYN Pre-Op H&P  [facility]    Patient Name: Padma Cowart     Patient : 1990  Room/Bed: [facility] OR [facility]/NONE  Admission Date/Time: 2020  9:34 AM  Primary Care Physician: Brendan Alpesheron  MRN: [id number]    Date: 2020  Time: 1:12 PM    Patient is a 31year old  referred to GYN/ONC for hx of complex endometrial hyperplasia with atypia. Patient has a long history of AUB, PCOS, and obesity. She was on medical treatment prior for this including metformin, provera, and OCPs. An US was performed in 2018 revealing an endometrial stripe of 7mm with bright calcifications. She then had an EMB performed revealing \"atypical complex endometrial hyperplasia. \". She then had a hysteroscopy and D&C performed with removal of an endometrial polyp. Final pathology revealed polypoid atypical complex endometrial hyperplasia. \". She stopped medical treatment as she was trying to conceive in 2019. She completed an infertility workup and was referred to Dr. Simba Stallings for further workup and management options. An US was performed revealing an endometrial stripe of 1.6mm. She also had a pelvic MRI done revealing a uterus measuring 7.1 x 4.7 x 3.6 with an 18mm thickness. It also showed cystic changes. The patient was seen in pre-op holding. She is here for a dilatation and curettage with Mirena IUD placement. The procedure risks and complications were reviewed. The labs, Consent, and H&P were reviewed and updated. The patient was counseled on the possibility of  the need of a second surgery. The patient voiced understanding and had all of her questions answered. The possibility of incomplete removal of abnormal tissue was discussed.     OBSTETRICAL HISTORY:   OB History    Para Term  AB Living   0 0 0 0 0 0   SAB TAB Ectopic Molar Multiple Live Births   0 0 0 0 0 0       PAST MEDICAL HISTORY:   has a past medical history of Hypertension, Obesity, PCOS (polycystic ovarian syndrome), Snores, Wears contact lenses, Wears prescription eyeglasses, and Wellness examination. PAST SURGICAL HISTORY:   has a past surgical history that includes hysteroscopy and Dilation and curettage of uterus (N/A, 11/6/2019). ALLERGIES:  Allergies as of 08/14/2020 - Review Complete 08/13/2020   Allergen Reaction Noted    Adhesive tape Rash 11/04/2019       MEDICATIONS:  Current Facility-Administered Medications   Medication Dose Route Frequency Provider Last Rate Last Dose    0.9 % sodium chloride infusion   Intravenous Continuous Lluvia Mejia PA-C        sodium chloride flush 0.9 % injection 10 mL  10 mL Intravenous 2 times per day [facility]        sodium chloride flush 0.9 % injection 10 mL  10 mL Intravenous PRN Lluvia Mejia PA-C        lactated ringers infusion 1,000 mL  1,000 mL Intravenous Continuous Jarad Mcnair MD 50 mL/hr at 09/08/20 1017 1,000 mL at 09/08/20 1017    fentaNYL (SUBLIMAZE) injection 50 mcg  50 mcg Intravenous Q5 Min PRN Napoleon House MD        ondansetron [facility]) injection 4 mg  4 mg Intravenous Once PRN Napoleon House MD           FAMILY HISTORY:  family history includes Cancer in her father, mother, paternal grandfather, and paternal grandmother; Diabetes in her maternal grandmother; Heart Disease in her maternal grandmother; Other in her father. SOCIAL HISTORY:   reports that she has never smoked. She has never used smokeless tobacco. She reports current alcohol use. She reports that she does not use drugs.     VITALS:  Vitals:    09/08/20 1008   BP: (!) 134/91   Pulse: 92   Resp: 20   Temp: 97.9 °F (36.6 °C)   TempSrc: Temporal   SpO2: 98%   Weight: (!) 341 lb (154.7 kg)   Height: 5' 5\" (1.651 m)                                                                                                                              PHYSICAL EXAM:     Unchanged from Prior H&P  CONSTITUTIONAL:  Alert and oriented, no acute distress  HEAD: normocephalic, atraumatic  EYES: Pupils equal and reactive to light, Extraocular muscles intact, sclera non icteric  ENT: Mucus membranes moist, No otorrhea, no rhinorrhea  NECK:  supple, symmetrical, trachea midline   LUNGS:  Good air movement bilaterally, unlabored respirations, no wheezes or rhonchi  CARDIOVASCULAR: Regular rate and rhythm, no murmurs rubs or gallops  ABDOMEN: soft, non tender, non distended, no rebound or guarding, no hernias, no hepatomegaly, no splenomegly  MUSCULOSKELETAL:  Equal strength bilaterally, normal muscle tone  SKIN: No abscess or rash  NEUROLOGIC:  Cranial nerves 2-12 grossly intact, no focal deficits  PSYCH: affect appropriate  Pelvic Exam: deferred to OR      LAB RESULTS:  Admission on 09/08/2020   Component Date Value Ref Range Status    Glucose 09/08/2020 126* 70 - 99 mg/dL Final    BUN 09/08/2020 11  6 - 20 mg/dL Final    CREATININE 09/08/2020 0.69  0.50 - 0.90 mg/dL Final    Bun/Cre Ratio 09/08/2020 NOT REPORTED  9 - 20 Final    Calcium 09/08/2020 9.1  8.6 - 10.4 mg/dL Final    Sodium 09/08/2020 138  135 - 144 mmol/L Final    Potassium 09/08/2020 4.3  3.7 - 5.3 mmol/L Final    Chloride 09/08/2020 103  98 - 107 mmol/L Final    CO2 09/08/2020 23  20 - 31 mmol/L Final    Anion Gap 09/08/2020 12  9 - 17 mmol/L Final    GFR Non- 09/08/2020 >60  >60 mL/min Final    GFR  09/08/2020 >60  >60 mL/min Final    GFR Comment 09/08/2020        Final    Comment: Average GFR for 30-36 years old:   107 mL/min/1.73sq m  Chronic Kidney Disease:   <60 mL/min/1.73sq m  Kidney failure:   <15 mL/min/1.73sq m              eGFR calculated using average adult body mass.  Additional eGFR calculator available at:        [employer]            GFR Staging 09/08/2020 NOT REPORTED   Final    hCG Qual 09/08/2020 NEGATIVE  NEGATIVE Final    Comment: Specimens with hCG levels near the threshold of the test (25 mIU/mL) may give a negative or   indeterminate result. In such cases, another test should be performed with a new specimen   in 48-72 hours. If early pregnancy is suspected clinically in this setting, correlation   with quantitative serum b-hCG level is suggested. [employer] has confirmed the use of plasma for this test. This has not been cleared   or approved by the U.S. Food and Drug Administration. The FDA has determined that such   clearance is not necessary.       WBC 09/08/2020 9.1  3.5 - 11.3 k/uL Final    RBC 09/08/2020 5.09  3.95 - 5.11 m/uL Final    Hemoglobin 09/08/2020 13.5  11.9 - 15.1 g/dL Final    Hematocrit 09/08/2020 42.6  36.3 - 47.1 % Final    MCV 09/08/2020 83.7  82.6 - 102.9 fL Final    MCH 09/08/2020 26.5  25.2 - 33.5 pg Final    MCHC 09/08/2020 31.7  28.4 - 34.8 g/dL Final    RDW 09/08/2020 13.4  11.8 - 14.4 % Final    Platelets 93/31/0066 272  138 - 453 k/uL Final    MPV 09/08/2020 10.9  8.1 - 13.5 fL Final    NRBC Automated 09/08/2020 0.0  0.0 per 100 WBC Final    Differential Type 09/08/2020 NOT REPORTED   Final    Seg Neutrophils 09/08/2020 63  36 - 65 % Final    Lymphocytes 09/08/2020 24  24 - 43 % Final    Monocytes 09/08/2020 7  3 - 12 % Final    Eosinophils % 09/08/2020 4  1 - 4 % Final    Basophils 09/08/2020 1  0 - 2 % Final    Immature Granulocytes 09/08/2020 1* 0 % Final    Segs Absolute 09/08/2020 5.82  1.50 - 8.10 k/uL Final    Absolute Lymph # 09/08/2020 2.14  1.10 - 3.70 k/uL Final    Absolute Mono # 09/08/2020 0.61  0.10 - 1.20 k/uL Final    Absolute Eos # 09/08/2020 0.38  0.00 - 0.44 k/uL Final    Basophils Absolute 09/08/2020 0.05  0.00 - 0.20 k/uL Final    Absolute Immature Granulocyte 09/08/2020 0.08  0.00 - 0.30 k/uL Final    WBC Morphology 09/08/2020 NOT REPORTED   Final    RBC Morphology 09/08/2020 NOT REPORTED   Final    Platelet Estimate 09/53/3345 NOT REPORTED   Final   Hospital Outpatient Visit on 09/04/2020   Component Date Value Ref Range Status    SARS-CoV-2, CATHERINE 09/04/2020 Not Detected  Not Detected Final    Comment: (NOTE)  This nucleic acid amplification test was developed and its  performance characteristics determined by [facility]. Nucleic acid amplification tests include PCR and TMA. This test has  not been FDA cleared or approved. This test has been authorized by  FDA under an Emergency Use Authorization (EUA). This test is only  authorized for the duration of time the declaration that  circumstances exist justifying the authorization of the emergency use  of in vitro diagnostic tests for detection of SARS-CoV-2 virus and/or  diagnosis of COVID-19 infection under section 564(b)(1) of the Act,  21 U. S.C. 526YCO-6(Q) (1), unless the authorization is terminated or  revoked sooner. When diagnostic testing is negative, the possibility of a false  negative result should be considered in the context of a patient's  recent exposures and the presence of clinical signs and symptoms  consistent with COVID-19. An individual without symptoms of COVID-  19 and who is not shedding SARS-CoV-2 vi                           amna would expect to have a  negative (not detected) result in this assay.   Performed At: [facility] [address]  [address], [place] [id number]  [facility] EB:[id number]     Hospital Outpatient Visit on 08/26/2020   Component Date Value Ref Range Status    Glucose 08/26/2020 99  70 - 99 mg/dL Final    BUN 08/26/2020 12  6 - 20 mg/dL Final    CREATININE 08/26/2020 0.93* 0.50 - 0.90 mg/dL Final    Bun/Cre Ratio 08/26/2020 NOT REPORTED  9 - 20 Final    Calcium 08/26/2020 9.6  8.6 - 10.4 mg/dL Final    Sodium 08/26/2020 139  135 - 144 mmol/L Final    Potassium 08/26/2020 4.1  3.7 - 5.3 mmol/L Final    Chloride 08/26/2020 99  98 - 107 mmol/L Final    CO2 08/26/2020 28  20 - 31 mmol/L Final    Anion Gap 08/26/2020 12  9 - 17 mmol/L Final    GFR Non- 08/26/2020 >60  >60 mL/min Final    GFR  08/26/2020 >60 >60 mL/min Final    GFR Comment 08/26/2020        Final    Comment: Average GFR for 30-36 years old:   80 mL/min/1.73sq m  Chronic Kidney Disease:   <60 mL/min/1.73sq m  Kidney failure:   <15 mL/min/1.73sq m              eGFR calculated using average adult body mass. Additional eGFR calculator available at:        [employer]            GFR Staging 08/26/2020 NOT REPORTED   Final    hCG Qual 08/26/2020 NEGATIVE  NEGATIVE Final    Comment: Specimens with hCG levels near the threshold of the test (25 mIU/mL) may give a negative or   indeterminate result. In such cases, another test should be performed with a new specimen   in 48-72 hours. If early pregnancy is suspected clinically in this setting, correlation   with quantitative serum b-hCG level is suggested. [employer] has confirmed the use of plasma for this test. This has not been cleared   or approved by the U.S. Food and Drug Administration. The FDA has determined that such   clearance is not necessary.       WBC 08/26/2020 9.9  3.5 - 11.3 k/uL Final    RBC 08/26/2020 5.48* 3.95 - 5.11 m/uL Final    Hemoglobin 08/26/2020 14.1  11.9 - 15.1 g/dL Final    Hematocrit 08/26/2020 45.2  36.3 - 47.1 % Final    MCV 08/26/2020 82.5* 82.6 - 102.9 fL Final    MCH 08/26/2020 25.7  25.2 - 33.5 pg Final    MCHC 08/26/2020 31.2  28.4 - 34.8 g/dL Final    RDW 08/26/2020 13.2  11.8 - 14.4 % Final    Platelets 54/59/6764 319  138 - 453 k/uL Final    MPV 08/26/2020 11.4  8.1 - 13.5 fL Final    NRBC Automated 08/26/2020 0.0  0.0 per 100 WBC Final    Differential Type 08/26/2020 NOT REPORTED   Final    Seg Neutrophils 08/26/2020 66* 36 - 65 % Final    Lymphocytes 08/26/2020 22* 24 - 43 % Final    Monocytes 08/26/2020 7  3 - 12 % Final    Eosinophils % 08/26/2020 4  1 - 4 % Final    Basophils 08/26/2020 1  0 - 2 % Final    Immature Granulocytes 08/26/2020 0  0 % Final    Segs Absolute 08/26/2020 6.58  1.50 - 8.10 k/uL Final    Absolute Lymph # 08/26/2020 2.22  1.10 - 3.70 k/uL Final    Absolute Mono # 08/26/2020 0.66  0.10 - 1.20 k/uL Final    Absolute Eos # 08/26/2020 0.37  0.00 - 0.44 k/uL Final    Basophils Absolute 08/26/2020 0.07  0.00 - 0.20 k/uL Final    Absolute Immature Granulocyte 08/26/2020 0.03  0.00 - 0.30 k/uL Final    WBC Morphology 08/26/2020 NOT REPORTED   Final    RBC Morphology 08/26/2020 MICROCYTOSIS PRESENT   Final    Platelet Estimate 82/60/2560 NOT REPORTED   Final    Ventricular Rate 08/26/2020 64  BPM Final    Atrial Rate 08/26/2020 64  BPM Final    P-R Interval 08/26/2020 148  ms Final    QRS Duration 08/26/2020 86  ms Final    Q-T Interval 08/26/2020 412  ms Final    QTc Calculation (Bazett) 08/26/2020 425  ms Final    P Axis 08/26/2020 20  degrees Final    R Axis 08/26/2020 -8  degrees Final    T Axis 08/26/2020 11  degrees Final    Alkaline Phosphatase 08/26/2020 55  35 - 104 U/L Final    ALT 08/26/2020 80* 5 - 33 U/L Final       DIAGNOSTICS:  Xr Chest (2 Vw)    Result Date: 8/26/2020  EXAMINATION: TWO XRAY VIEWS OF THE CHEST 8/26/2020 3:28 pm COMPARISON: January 24, 2009, chest examination HISTORY: ORDERING SYSTEM PROVIDED HISTORY: preop, hyperplasia TECHNOLOGIST PROVIDED HISTORY: preop, hyperplasia Reason for Exam: DNC and IUD FINDINGS: Stable normal cardiopericardial silhouette There are no significant pleural, parenchymal or mediastinal findings     No acute cardiopulmonary findings     Mri Pelvis W Wo Contrast    Result Date: 8/25/2020  EXAMINATION: MRI OF THE PELVIS WITHOUT AND WITH CONTRAST, 8/25/2020 2:19 pm TECHNIQUE: Multiplanar multisequence MRI of the pelvis was performed without and with the administration of intravenous contrast. COMPARISON: Pelvic ultrasound 08/07/2020 HISTORY: ORDERING SYSTEM PROVIDED HISTORY: Complex endometrial hyperplasia with atypia TECHNOLOGIST PROVIDED HISTORY: Is the patient pregnant?->No FINDINGS: The uterus measures 3.6 x 4.7 x 7.1 an incomplete removal of abnormal tissue. The patient was counseled at length about the risks of juan Covid-19 during their perioperative period and any recovery window from their procedure. The patient was made aware that juan Covid-19  may worsen their prognosis for recovering from their procedure  and lend to a higher morbidity and/or mortality risk. All material risks, benefits, and reasonable alternatives including postponing the procedure were discussed. The patient does wish to proceed with the procedure at this time. Moriah Wan DO  Ob/Gyn Resident  Pager: [phone] [facility], [place]  9/8/2020, 1:12 PM       Attending Physician Statement  I have discussed the care of Wagner Knight, including pertinent history and exam findings,  with the resident. I have seen and examined the patient and the key elements of all parts of the encounter have been performed by me. I agree with the assessment, plan and orders as documented by the resident.      Angelique Mackenzie MD

## 2020-09-08 NOTE — BRIEF OP NOTE
Brief Postoperative Note      Patient: Sanam Rubi  YOB: 1990  MRN: [id number]    Date of Procedure: 9/8/2020    Pre-Op Diagnosis: ATYPICAL ENDOMETRIAL HYPERPLASIA    Post-Op Diagnosis: Same. Desire to preserve fertility. Procedure(s):  DILATATION AND CURETTAGE, MIRENA PLACEMENT    Surgeon(s):  Miriam Whaley MD    Assistant:  Resident: Trisha Srinivasan DO    Anesthesia: General    Estimated Blood Loss (mL): 5 cc    Complications: None    Specimens:   ID Type Source Tests Collected by Time Destination   A : ENDOMETRIAL CURETTINGS Tissue Vaginal SURGICAL PATHOLOGY Miriam Whaley MD 9/8/2020 1403        Implants:  Mirena IUD placed in uterus    Drains: No wound drains    Findings: Patient was found to have a 7 cm uterine cavity. She had a small to moderate amount of normal-appearing tissue removed at the time of the D&C. IUD placed without difficulty.     Electronically signed by Donna Figueroa MD on 9/8/2020 at 2:37 PM

## 2020-09-08 NOTE — ANESTHESIA PRE PROCEDURE
 ondansetron (ZOFRAN) injection 4 mg  4 mg Intravenous Once PRN Arcenio Yoder MD           Allergies: Allergies   Allergen Reactions    Adhesive Tape Rash     Tegaderm, clear tape ok to use       Problem List:    Patient Active Problem List   Diagnosis Code    Fatigue R53.83    Morbid obesity with BMI of 50.0-59.9, adult ([facility]) E66.01, Z68.43    Essential hypertension I10    Nervousness R45.0    Endometrial polyp N84.0    Complex endometrial hyperplasia with atypia N85.02       Past Medical History:        Diagnosis Date    Hypertension     RZach Lay-TK    Obesity     PCOS (polycystic ovarian syndrome)     Snores     Wears contact lenses     Wears prescription eyeglasses     Wellness examination     RZach Lay-NP PCP last visit 7/28/2020-[place]       Past Surgical History:        Procedure Laterality Date    DILATION AND CURETTAGE OF UTERUS N/A 11/6/2019    DILATATION AND CURETTAGE HYSTEROSCOPY-POLYPECTOMY performed by Francia Francois MD at [address] [place] [address] HYSTEROSCOPY      D&C with polypectomy       Social History:    Social History     Tobacco Use    Smoking status: Never Smoker    Smokeless tobacco: Never Used   Substance Use Topics    Alcohol use: Yes     Alcohol/week: 0.0 standard drinks     Types: 1 Glasses of wine, 1 Standard drinks or equivalent per week     Comment: Occ                                Counseling given: Not Answered      Vital Signs (Current): There were no vitals filed for this visit.                                            BP Readings from Last 3 Encounters:   09/08/20 (!) 134/91   08/26/20 (!) 150/100   08/13/20 (!) 138/100       NPO Status:                                                                                 BMI:   Wt Readings from Last 3 Encounters:   09/08/20 (!) 341 lb (154.7 kg)   08/26/20 (!) 339 lb 3.2 oz (153.9 kg)   08/13/20 (!) 342 lb 9.6 oz (155.4 kg)     There is no height or weight on file to calculate BMI.    CBC:   Lab Results   Component patient. Plan discussed with CRNA.     Attending anesthesiologist reviewed and agrees with 2300 Sanaz Cox MD   9/8/2020

## 2020-09-09 LAB — SURGICAL PATHOLOGY REPORT: NORMAL

## 2020-09-09 NOTE — OP NOTE
symmetrical uterus. Adnexa  could not be palpated because of her obesity. There was no nodularity  noted in the cul-de-sac. No vaginal lesions were noted. The anterior cervix was grasped with a single-tooth tenaculum, and the  uterus was sounded to 7 cm. It was then gradually dilated up to a #20  Kwaku's dilator. A small endometrial curette was used to thoroughly  curette the cavity gently. Only a small-to-moderate amount of  normal-appearing tissue was obtained. The Mirena IUD was then placed without difficulty, and the string was  cut at approximately 1-1/2 inches. The patient tolerated the procedure well and was taken to the recovery  room in satisfactory condition.         Brando Mcdowell MD    D: 09/08/2020 15:04:51       T: 09/08/2020 15:42:21     [email or web address]  Job#: [id number]     Doc#: [id number]    CC:

## 2020-09-17 ENCOUNTER — OFFICE VISIT (OUTPATIENT)
Dept: GYNECOLOGIC ONCOLOGY | Age: 30
End: 2020-09-17

## 2020-09-17 VITALS — DIASTOLIC BLOOD PRESSURE: 103 MMHG | HEART RATE: 93 BPM | TEMPERATURE: 97.3 F | SYSTOLIC BLOOD PRESSURE: 157 MMHG

## 2020-09-17 PROBLEM — C54.1 ENDOMETRIAL CANCER (HCC): Status: ACTIVE | Noted: 2020-09-17

## 2020-09-17 PROCEDURE — 99024 POSTOP FOLLOW-UP VISIT: CPT | Performed by: OBSTETRICS & GYNECOLOGY

## 2020-09-17 ASSESSMENT — ENCOUNTER SYMPTOMS: ABDOMINAL PAIN: 1

## 2020-09-17 NOTE — PROGRESS NOTES
Review of Systems   Gastrointestinal: Positive for abdominal pain. Having bowel movements more frequently   Neurological: Positive for headaches. Hematological: Bruises/bleeds easily. All other systems reviewed and are negative.

## 2020-09-17 NOTE — PROGRESS NOTES
[address] [facility], [facility], [address] Adela Jesus is a 31 y.o. female who presents for a Post Operative visit today following her surgery of 2020    HPI: Kaylee Bonner is a 79-year-old  0 para 0 female who was referred because of a diagnosis of atypical adenomatous hyperplasia of the endometrium. The patient has had a lifelong history of obesity and irregular menses. The patient recently had been trying to conceive. A previous ultrasound on 3/7/2016 revealed bilateral ovaries with multiple follicle cysts consistent with polycystic ovarian syndrome. The patient was placed on metformin and Provera initially and eventually on oral contraceptives. She did have some relief of bleeding and eventually developed amenorrhea. The patient discontinued the birth control pills in order to try to achieve a pregnancy in 2019. The patient did not have regular menstruation and did not become pregnant. She was therefore restarted on Provera. Because of recurrent abnormal bleeding the patient had an endometrial biopsy by Dr. Jessie Chaparro on 2019 that revealed \"atypical complex endometrial hyperplasia\". He scheduled her for hysteroscopy and D&C and this was performed on 2019. A large endometrial polyp was removed that confirmed the complex atypical hyperplasia. A Ca1 25 antigen level was performed that returned within normal limits at 6 units. The patient was then referred to Dr. Tonya Mejia, reproductive endocrinologist and to the bariatric weight management department. A repeat transvaginal ultrasound was performed and revealed an endometrial stripe at 1.6 cm. The patient was referred to [facility] gynecologic oncology for management of the hyperplasia. She was seen by Dr. Velia Sadler on 2020. It was recommended that she have a Mirena, progesterone emitting IUD.     Because of the possibility of an occult adenocarcinoma it was recommended that she initially have a pelvic MRI to rule out myometrial invasion. An MRI was performed on 8/25/2020 and there was no evidence of any myometrial pathology. The patient was therefore scheduled for a D&C and placement of Mirena IUD. This was performed on 9/8/2020. Final pathology did return with areas of \"well differentiated adenocarcinoma of the endometrium with squamous differentiation, areas of atypical complex hyperplasia and polyp fragments\". Patient returns today for follow-up. She states she is having practically no bleeding and no cramping. She is enrolled in the [address] [place] [address] bariatric program.    ROS:  I have personally reviewed and agree with the review of systems done by my ancillary staff in the [facility] documentation. Objective:  Vitals:    09/17/20 1457 09/17/20 1527   BP: (!) 170/114 (!) 157/103   Pulse: 97 93   Temp: 97.3 °F (36.3 °C)    TempSrc: Temporal        Physical Exam:  General: Patient is in no distress. Pelvic exam: Speculum examination was performed and revealed the IUD string to be noted approximately 1-1/4 inches through the cervical loss. 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 Post Operative Changes: Good thus far    Mirena IUD string in good position    Discussed result with patients, and all questions were answered. Plan:    Patient will continue with the bariatric program.    We will see the patient again in approximately 3 months for follow-up and endometrial biopsy. The plan is to continue to follow and biopsy her until she has had 3- endometrial biopsies. At that point she will need to be seen back by Dr. Evette Hammond, reproductive endocrinologist in order to help her achieve a pregnancy and the IUD will be removed at that time.     Electronically signed by Arianna Campos MD on 9/17/20 at 3:47 PM EDT

## 2020-10-08 ENCOUNTER — TELEPHONE (OUTPATIENT)
Dept: GYNECOLOGIC ONCOLOGY | Age: 30
End: 2020-10-08

## 2020-10-08 PROBLEM — R11.0 NAUSEA IN ADULT: Status: ACTIVE | Noted: 2020-10-08

## 2020-10-08 RX ORDER — ONDANSETRON 4 MG/1
4 TABLET, ORALLY DISINTEGRATING ORAL EVERY 8 HOURS PRN
Qty: 20 TABLET | Refills: 1 | Status: SHIPPED | OUTPATIENT
Start: 2020-10-08

## 2020-10-08 NOTE — TELEPHONE ENCOUNTER
Patient calling with concerns of nausea x three weeks. No other symptoms such as sinus drainage or fever. Has not been sexually active since IUD placed in September. Patient is asking for something for the nausea and believes it may be related to the progesterone.

## 2020-10-13 ENCOUNTER — TELEPHONE (OUTPATIENT)
Dept: BARIATRICS/WEIGHT MGMT | Age: 30
End: 2020-10-13

## 2020-10-13 NOTE — TELEPHONE ENCOUNTER
Online Info Session Completed:  on 10/08/20     Verified Insurance Benefit   with [employer]    Patient informed the following:    lvm - patient DOES NOT have a bariatric surgery benefit.

## 2020-10-20 ENCOUNTER — HOSPITAL ENCOUNTER (OUTPATIENT)
Age: 30
Discharge: HOME | End: 2020-10-20
Payer: COMMERCIAL

## 2020-10-20 ENCOUNTER — HOSPITAL ENCOUNTER (OUTPATIENT)
Age: 30
Setting detail: SPECIMEN
Discharge: HOME | End: 2020-10-20
Payer: COMMERCIAL

## 2020-10-20 LAB
ABO/RH: NORMAL
HEPATITIS B CORE IGM ANTIBODY: NONREACTIVE
HEPATITIS C ANTIBODY: NONREACTIVE
HIV AG/AB: NONREACTIVE
PROLACTIN: 12.51 UG/L (ref 4.79–23.3)
RUBV IGG SER QL: 27.9 IU/ML
TSH SERPL DL<=0.05 MIU/L-ACNC: 4.19 MIU/L (ref 0.3–5)
VITAMIN D 25-HYDROXY: 16 NG/ML (ref 30–100)

## 2020-10-20 PROCEDURE — 86803 HEPATITIS C AB TEST: CPT

## 2020-10-20 PROCEDURE — 82306 VITAMIN D 25 HYDROXY: CPT

## 2020-10-20 PROCEDURE — 83520 IMMUNOASSAY QUANT NOS NONAB: CPT

## 2020-10-20 PROCEDURE — 81240 F2 GENE: CPT

## 2020-10-20 PROCEDURE — 81241 F5 GENE: CPT

## 2020-10-20 PROCEDURE — 86900 BLOOD TYPING SEROLOGIC ABO: CPT

## 2020-10-20 PROCEDURE — 86705 HEP B CORE ANTIBODY IGM: CPT

## 2020-10-20 PROCEDURE — 83036 HEMOGLOBIN GLYCOSYLATED A1C: CPT

## 2020-10-20 PROCEDURE — 86787 VARICELLA-ZOSTER ANTIBODY: CPT

## 2020-10-20 PROCEDURE — 84146 ASSAY OF PROLACTIN: CPT

## 2020-10-20 PROCEDURE — 87350 HEPATITIS BE AG IA: CPT

## 2020-10-20 PROCEDURE — 87389 HIV-1 AG W/HIV-1&-2 AB AG IA: CPT

## 2020-10-20 PROCEDURE — 87591 N.GONORRHOEAE DNA AMP PROB: CPT

## 2020-10-20 PROCEDURE — 84443 ASSAY THYROID STIM HORMONE: CPT

## 2020-10-20 PROCEDURE — 86901 BLOOD TYPING SEROLOGIC RH(D): CPT

## 2020-10-20 PROCEDURE — 87491 CHLMYD TRACH DNA AMP PROBE: CPT

## 2020-10-20 PROCEDURE — 86780 TREPONEMA PALLIDUM: CPT

## 2020-10-20 PROCEDURE — 86762 RUBELLA ANTIBODY: CPT

## 2020-10-20 PROCEDURE — 36415 COLL VENOUS BLD VENIPUNCTURE: CPT

## 2020-10-21 ENCOUNTER — OFFICE VISIT (OUTPATIENT)
Dept: GYNECOLOGIC ONCOLOGY | Age: 30
End: 2020-10-21
Payer: COMMERCIAL

## 2020-10-21 VITALS
HEIGHT: 65 IN | HEART RATE: 91 BPM | WEIGHT: 293 LBS | SYSTOLIC BLOOD PRESSURE: 135 MMHG | OXYGEN SATURATION: 98 % | DIASTOLIC BLOOD PRESSURE: 96 MMHG | BODY MASS INDEX: 48.82 KG/M2

## 2020-10-21 LAB
ESTIMATED AVERAGE GLUCOSE: 137 MG/DL
HBA1C MFR BLD: 6.4 % (ref 4–6)
VZV IGG SER QL IA: 0.8

## 2020-10-21 PROCEDURE — 99213 OFFICE O/P EST LOW 20 MIN: CPT | Performed by: PHYSICIAN ASSISTANT

## 2020-10-21 RX ORDER — CYANOCOBALAMIN/FOLIC AC/VIT B6 1-2.2-25MG
1 TABLET ORAL DAILY
COMMUNITY
End: 2021-07-15

## 2020-10-21 ASSESSMENT — ENCOUNTER SYMPTOMS
HEMOPTYSIS: 0
TROUBLE SWALLOWING: 0
NAUSEA: 1
CHEST TIGHTNESS: 0
EYE PROBLEMS: 0
ABDOMINAL PAIN: 1
VOMITING: 0
SORE THROAT: 0
CONSTIPATION: 0
VOICE CHANGE: 0
DIARRHEA: 0
SCLERAL ICTERUS: 0
SHORTNESS OF BREATH: 0
BLOOD IN STOOL: 0
BACK PAIN: 1
ABDOMINAL DISTENTION: 0
RECTAL PAIN: 0
WHEEZING: 0
COUGH: 0

## 2020-10-21 NOTE — PATIENT INSTRUCTIONS
1. Plan to obtain pelvic ultrasound at next availability  2. Plan to follow up in December as planned unless needed sooner  3.  Call or return to clinic with any questions or concerns

## 2020-10-21 NOTE — PROGRESS NOTES
[address] [place] [address], [address] Adela Molina is a 31 y.o. female who presents for a follow up visit    Chief Complaint:  Nausea follow up     HPI: She is a pleasant  female who initially presented for a referral from her local OB/GYN who diagnosed the patient with complex endometrial hyperplasia with atypia on D&C in 2019. She had a long standing history of abnormal menstruation, and amenorrhea. She was advised to have a pelvic ultrasound which was obtained on 2020 and revealed a thickened endometrial stripe at 1.6 cm. Therefore she was advised to have a pelvic MRI and plan for a repeat D&C and progesterone IUD. A pre operative  was within normal limits at 6 units on 2020. Pelvic MRI on 2020 for further evaluation of the uterus measuring 3.6 x 4.7 x 7.1 cm, the endometrial measures 18 mm in AP thickness with cystic changes noted. No evidence of invasive disease. She was then taken to the OR on 2020 for a D&C and Mirena IUD placement. Final pathology revealed a well-differentiated endometrioid carcinoma, FIGO grade 1 in the background of atypical complex hyperplasia. Discussion was then held and as the patient is desiring fertility and the MRI was negative for advanced disease it was decided that the patient will be medically managed with progesterone IUD and subsequent endometrial biopsies. She has recently been seen by reproductive endocrinologist Dr. Jeff Leon. She has now started the work up process for fertility measures. She is also in the process of attempted to have bariatric weight loss surgery. She then most recently notified our office on 10/8 of complaint of nausea for 3 weeks. She felt it was a side effect of progesterone therapy. She was therefore prescribed Zofran and asked to follow up in office. Today, she is anxious.  She states she has noticed abdominal cramping and lower back discomfort for 3 weeks, with associated nausea. She is using the Zofran 4mg once daily with relief. She is also dieting at this time. She denies vaginal bleeding since her IUD placement. She notes normal urination and bowel habits. She denies fever or chills. Review of Systems  I have personally reviewed and agree with the review of the systems done by my ancillary staff in the [facility] documentation. Objective:  Vitals:    10/21/20 1132 10/21/20 1147   BP: (!) 150/111 (!) 135/96   Site: Left Lower Arm Left Lower Arm   Position: Sitting Sitting   Cuff Size: Medium Adult Medium Adult   Pulse: 91    SpO2: 98%    Weight: (!) 347 lb (157.4 kg)    Height: 5' 5\" (1.651 m)        Physical Exam   General:  Anxious, yet well appearing, alert and oriented x 3, No acute distress. Abdomen: Morbidly obese, there is mild tenderness to deep palpation at the lower central abdomen. No palpable masses or ascites. No detectable organomegaly. No inguinal lymphadenopathy bilaterally. Pelvic Exam using a large speculum reveals a long vaginal canal. The cervix is visualized entirely and the IUD strings are present and softened around the posterior cervix. There is scant white discharge present, there is no blood present. No odor or foul discharge. Bimanual examination reveals a smooth vaginal canal, no palpable nodule or firmness noted. The IUD strings are palpable and the cervix is noted to be without firmness or nodularity. No adnexal pathology palpable. Lab Results   Component Value Date     6 08/07/2020       Assessment:  Cancer Staging  Endometrial cancer [facility])  Staging form: Corpus Uteri - Carcinoma And Carcinosarcoma, AJCC 8th Edition  - Clinical stage from 9/17/2020: FIGO Stage I (cT1, cN0, cM0) - Signed by David Green MD on 9/17/2020  Staging comments: The patient is a young infertility patient and did not wish to have hysterectomy. She was therefore staged only clinically.   An MRI of the pelvis was performed. 1. Nausea in adult    2. Endometrial cancer ([facility] [address]    3. D&C, hysteroscopy, mirena IUD insertion 9/8        Plan:  1. Obtain pelvic and transvaginal ultrasound and notify accordingly    2. Continue to use anti-emetic as needed at this time    3. Continue planning with Dr. Mason Ng as intended     Follow Up:  1. Returned as scheduled in 2 months for examination and endometrial biopsy     2. Advised to call or return to clinic sooner     I spent 15 minutes providing care to the patient and >50% of the time was spent counseling and conoordinating care, discussing the patient's current situation, reviewing her options, counseling and education her and answering her questions. All of the patient's pertinent images, labs and previous records and procedures were reviewed.     Electronically signed by Jose Angel Alonso PA-C on 10/21/20 at 11:43 AM EDT

## 2020-10-21 NOTE — PROGRESS NOTES
Review of Systems   Constitutional: Positive for appetite change and fatigue. Negative for chills, diaphoresis, fever and unexpected weight change. Loss of over last couple weeks. HENT:   Negative for hearing loss, lump/mass, mouth sores, nosebleeds, sore throat, tinnitus, trouble swallowing and voice change. Eyes: Negative for eye problems and icterus. Respiratory: Negative for chest tightness, cough, hemoptysis, shortness of breath and wheezing. Cardiovascular: Negative for chest pain, leg swelling and palpitations. Gastrointestinal: Positive for abdominal pain and nausea. Negative for abdominal distention, blood in stool, constipation, diarrhea, rectal pain and vomiting. On and off since placement of IUD. Endocrine: Positive for hot flashes. Adjusting to hormones. Genitourinary: Negative for bladder incontinence, difficulty urinating, dyspareunia, dysuria, frequency, hematuria, menstrual problem, nocturia, pelvic pain, vaginal bleeding and vaginal discharge. Musculoskeletal: Positive for back pain. Negative for arthralgias, flank pain, gait problem, myalgias, neck pain and neck stiffness. On and off since IUD placement   Skin: Negative for itching, rash and wound. Neurological: Negative for dizziness, extremity weakness, gait problem, headaches, light-headedness, numbness, seizures and speech difficulty. Hematological: Negative for adenopathy. Does not bruise/bleed easily. Psychiatric/Behavioral: Negative for confusion, decreased concentration, depression, sleep disturbance and suicidal ideas. The patient is nervous/anxious.

## 2020-10-22 LAB
HEPATITIS BE ANTIGEN: NEGATIVE
T. PALLIDUM, IGG: NONREACTIVE

## 2020-10-23 LAB
ANTI-MULLERIAN HORMONE: 3.63 NG/ML (ref 0.18–11.71)
C. TRACHOMATIS DNA ,URINE: NEGATIVE
N. GONORRHOEAE DNA, URINE: NEGATIVE
SPECIMEN DESCRIPTION: NORMAL

## 2020-10-25 LAB
MISCELLANEOUS LAB TEST RESULT: NORMAL
TEST NAME: NORMAL

## 2020-10-30 LAB
MISCELLANEOUS LAB TEST RESULT: NORMAL
TEST NAME: NORMAL

## 2020-11-13 ENCOUNTER — TELEPHONE (OUTPATIENT)
Dept: GYNECOLOGIC ONCOLOGY | Age: 30
End: 2020-11-13

## 2020-11-13 NOTE — TELEPHONE ENCOUNTER
11.13.2020 [facility]- Patient called back the office, she verbalized understanding to get her [address] done before her next appointment date of 12/18/2020. Patient said she will be going to [facility] to get those done.

## 2020-12-16 ENCOUNTER — HOSPITAL ENCOUNTER (OUTPATIENT)
Dept: ULTRASOUND IMAGING | Age: 30
Discharge: HOME | End: 2020-12-18
Payer: COMMERCIAL

## 2020-12-16 PROCEDURE — 76830 TRANSVAGINAL US NON-OB: CPT

## 2020-12-16 PROCEDURE — 76856 US EXAM PELVIC COMPLETE: CPT

## 2020-12-18 ENCOUNTER — OFFICE VISIT (OUTPATIENT)
Dept: GYNECOLOGIC ONCOLOGY | Age: 30
End: 2020-12-18
Payer: COMMERCIAL

## 2020-12-18 ENCOUNTER — HOSPITAL ENCOUNTER (OUTPATIENT)
Age: 30
Setting detail: SPECIMEN
Discharge: HOME | End: 2020-12-18
Payer: COMMERCIAL

## 2020-12-18 VITALS
OXYGEN SATURATION: 96 % | TEMPERATURE: 93.7 F | DIASTOLIC BLOOD PRESSURE: 90 MMHG | HEART RATE: 100 BPM | SYSTOLIC BLOOD PRESSURE: 140 MMHG

## 2020-12-18 LAB
CONTROL: NORMAL
PREGNANCY TEST URINE, POC: NORMAL

## 2020-12-18 PROCEDURE — 81025 URINE PREGNANCY TEST: CPT | Performed by: PHYSICIAN ASSISTANT

## 2020-12-18 PROCEDURE — 58100 BIOPSY OF UTERUS LINING: CPT | Performed by: PHYSICIAN ASSISTANT

## 2020-12-18 PROCEDURE — 99214 OFFICE O/P EST MOD 30 MIN: CPT | Performed by: PHYSICIAN ASSISTANT

## 2020-12-18 ASSESSMENT — ENCOUNTER SYMPTOMS
BACK PAIN: 1
DIARRHEA: 1
ABDOMINAL PAIN: 1

## 2020-12-18 NOTE — PROGRESS NOTES
bilaterally. Extremities: No edema, deformities, or calf tenderness bilaterally    Pelvic Exam using a large speculum reveals a long vaginal canal. The cervix is visualized entirely and the IUD strings are present. There is no discharge present, there is no blood present. No odor or foul discharge. The cervix was then cleansed with betadine and a single tooth tenaculum was used on the anterior cervix. The endometrial Pipelle was then used and the endometrium was sounded to 7 cm. In a gentle fashion, an endometrial biopsy was then obtained. The tenaculum was removed and direct pressure was applied. Adequate hemostasis was achieved. The patient tolerated without incident. Bimanual examination reveals a smooth vaginal canal, no palpable nodule or firmness noted. The IUD strings are palpable and the cervix is noted to be without firmness or nodularity. No palpable adnexal pathology. Lab Results   Component Value Date     6 08/07/2020       Assessment:  Cancer Staging  Endometrial cancer [facility])  Staging form: Corpus Uteri - Carcinoma And Carcinosarcoma, AJCC 8th Edition  - Clinical stage from 9/17/2020: FIGO Stage I (cT1, cN0, cM0) - Signed by Brigette Devi MD on 9/17/2020  Staging comments: The patient is a young infertility patient and did not wish to have hysterectomy. She was therefore staged only clinically. An MRI of the pelvis was performed. 1. Endometrial cancer ([address]    2. D&C, hysteroscopy, mirena IUD insertion 9/8        Plan:  1. Will await endometrial biopsy taken today    2. Continue to follow with reproductive endocrinology and weight management as instructed. Follow Up:  1. Plan to return to clinic in 3 months for pelvic examination and repeat endometrial biopsy. We re-discussed the care plan that we will want three consecutive negative endometrial biopsies prior to removal of IUD and achieving pregnancy.     I spent 25 minutes providing care to the patient and >50% of the time was spent counseling and conoordinating care, discussing the patient's current situation, reviewing her options, counseling and education her and answering her questions. All of the patient's pertinent images, labs and previous records and procedures were reviewed.     Electronically signed by Gama Motta PA-C on 12/18/2020 at 3:43 PM EDT

## 2020-12-18 NOTE — PROGRESS NOTES
Review of Systems   Gastrointestinal: Positive for abdominal pain (ocassionally) and diarrhea. Musculoskeletal: Positive for back pain (mostly at night ). All other systems reviewed and are negative.

## 2020-12-22 LAB — SURGICAL PATHOLOGY REPORT: NORMAL

## 2021-01-19 ENCOUNTER — TELEPHONE (OUTPATIENT)
Dept: BARIATRICS/WEIGHT MGMT | Age: 31
End: 2021-01-19

## 2021-01-19 NOTE — TELEPHONE ENCOUNTER
lvm for patient.     Online Info Session Completed:  on 1/7/21 Verified Insurance Benefit   with [employer]    Patient informed the following:    Bariatric surgery is excluded from patients policy (NO coverage)

## 2021-02-01 ENCOUNTER — TELEPHONE (OUTPATIENT)
Dept: BARIATRICS/WEIGHT MGMT | Age: 31
End: 2021-02-01

## 2021-02-01 NOTE — TELEPHONE ENCOUNTER
Online Info Session Completed:  on 1/26/21 Verified Insurance Benefit   with [employer]    Patient informed the following:    Patient notified that she does not have a benefit for bariatric surgery

## 2021-03-17 ENCOUNTER — TELEPHONE (OUTPATIENT)
Dept: GYNECOLOGIC ONCOLOGY | Age: 31
End: 2021-03-17

## 2021-03-17 NOTE — TELEPHONE ENCOUNTER
Called patient to remind her to have [address] done prior to appt. No answer, LVM to return call.

## 2021-03-22 ENCOUNTER — HOSPITAL ENCOUNTER (OUTPATIENT)
Dept: ULTRASOUND IMAGING | Age: 31
Discharge: HOME | End: 2021-03-24
Payer: COMMERCIAL

## 2021-03-22 DIAGNOSIS — Z98.890 POSTOPERATIVE STATE: ICD-10-CM

## 2021-03-22 DIAGNOSIS — C54.1 ENDOMETRIAL CANCER (HCC): ICD-10-CM

## 2021-03-22 PROCEDURE — 76856 US EXAM PELVIC COMPLETE: CPT

## 2021-03-22 PROCEDURE — 76830 TRANSVAGINAL US NON-OB: CPT

## 2021-03-25 ENCOUNTER — OFFICE VISIT (OUTPATIENT)
Dept: GYNECOLOGIC ONCOLOGY | Age: 31
End: 2021-03-25
Payer: COMMERCIAL

## 2021-03-25 ENCOUNTER — HOSPITAL ENCOUNTER (OUTPATIENT)
Age: 31
Setting detail: SPECIMEN
Discharge: HOME | End: 2021-03-25
Payer: COMMERCIAL

## 2021-03-25 VITALS
OXYGEN SATURATION: 97 % | WEIGHT: 293 LBS | DIASTOLIC BLOOD PRESSURE: 94 MMHG | SYSTOLIC BLOOD PRESSURE: 165 MMHG | TEMPERATURE: 96.1 F | HEART RATE: 86 BPM | BODY MASS INDEX: 58.16 KG/M2

## 2021-03-25 DIAGNOSIS — Z97.5 IUD (INTRAUTERINE DEVICE) IN PLACE: ICD-10-CM

## 2021-03-25 DIAGNOSIS — Z79.890 PERSONAL HISTORY OF ONGOING TREATMENT WITH HORMONAL THERAPY: ICD-10-CM

## 2021-03-25 DIAGNOSIS — L73.8 BACTERIAL FOLLICULITIS: ICD-10-CM

## 2021-03-25 DIAGNOSIS — C54.1 ENDOMETRIAL CANCER (HCC): Primary | ICD-10-CM

## 2021-03-25 LAB
CONTROL: NORMAL
PREGNANCY TEST URINE, POC: NORMAL

## 2021-03-25 PROCEDURE — 99214 OFFICE O/P EST MOD 30 MIN: CPT | Performed by: PHYSICIAN ASSISTANT

## 2021-03-25 PROCEDURE — 58100 BIOPSY OF UTERUS LINING: CPT | Performed by: PHYSICIAN ASSISTANT

## 2021-03-25 PROCEDURE — 81025 URINE PREGNANCY TEST: CPT | Performed by: PHYSICIAN ASSISTANT

## 2021-03-25 RX ORDER — CLINDAMYCIN PHOSPHATE 11.9 MG/ML
SOLUTION TOPICAL
Qty: 30 ML | Refills: 1 | Status: SHIPPED | OUTPATIENT
Start: 2021-03-25 | End: 2021-04-24

## 2021-03-25 NOTE — PROGRESS NOTES
[address] [place] [address], [address] Adela Lees is a 27 y.o. female who presents for her endometrial cancer surveillance visit    Chief Complaint:  Endometrial cancer    HPI: She is a pleasant  female who was initially following with her local OB/GYN for long standing history of amenorrhea. She was then dounf to have complex endometrial hyperplasia with atypia on D&C in 2019. She had a long standing history of abnormal menstruation, and amenorrhea. She was advised to have a pelvic ultrasound which was obtained on 2020 and revealed a thickened endometrial stripe at 1.6 cm. Therefore she was advised to have a pelvic MRI and plan for a repeat D&C and progesterone IUD. It was discussed in detail at this time that the patient was desiring future fertility. She wanted to approach her symptoms and diagnoses with fertility-sparing measures. It was also advised that she begin the work up and process for infertility and weight management to aid in her desires. Her preoperative  was within normal limits at 6 units on 2020. Pelvic MRI on 2020 for further evaluation of the uterus measuring 3.6 x 4.7 x 7.1 cm, the endometrial measures 18 mm in AP thickness with cystic changes noted. No evidence of invasive disease. She was then taken to the OR on 2020 for a D&C and Mirena IUD placement. Final pathology revealed a well-differentiated endometrioid carcinoma, FIGO grade 1 in the background of atypical complex hyperplasia. Discussion was then held regarding the pathologic diagnosis and since the patient is desiring fertility and the MRI was negative for advanced disease, shared decision making was held for the patient to be medically managed with progesterone IUD. She was advised to have serial transvaginal ultrasound and endometrial biopsies every 3 months for 3 consecutive negative biopsy results.     She underwent a pelvic ultrasound was performed on 12/16/2020 which has revealed a decrease in her endometrial stripe, now measuring 8.9 mm. Uterus measures 7.6 x 4.3 x 4.7 cm, with normal myometrial echo texture. IUD was noted to be the appropriate position. No adnexal pathology. Follow up endometrial biopsy performed on 12/18/2020 revealed endometrial incision with hormone effect, negative for residual atypical glandular proliferation or malignancy. She was advised to have a repeat pelvic ultrasound performed in present today for repeat endometrial biopsy. Her most recent pelvic ultrasound performed on 3/22/2021 revealed the uterus measuring 6.7 x 4.8 x 3.4 cm. Her endometrial stripe is now measuring 1.1 cm, within normal limits for premenopausal female. The position of the IUD in the cavity. Bilateral ovaries within normal limits. There is no evidence of free fluid. Today, she is doing well. She denies any abdominal or pelvic pain. She has no new pains. She does notices some \"skin boils\" under her axillae and pubic skin. She notes they generally resolve with warm compress however can take weeks to resolve. She denies vaginal bleeding. She states no menstrual cycle with current IUD. She is continuing to work on her weight she is awaiting approval with bariatric program.  She has not been back to see reproductive endocrinologist as of yet. She denies chest pain or shortness of breath. She has no headache or vision changes. She is up to date on pap smear obtained 7/29/2020 was satisfactory and negative for intraepithelial lesion or malignancy. Review of Systems  I have personally reviewed and agree with the review of the systems done by my ancillary staff in the [facility] documentation.     Objective:  Vitals:    03/25/21 1536 03/25/21 1612   BP: (!) 171/103 (!) 165/94   Pulse: 86    Temp: 96.1 °F (35.6 °C)    TempSrc: Temporal    SpO2: 97%    Weight: (!) 349 lb 8 oz (158.5 kg)        Physical Exam General: well appearing, alert and oriented x 3, no acute distress. HEENT: No cervical or supraclavicular lymphadenopathy. No thyromegaly. Lungs: Clear to auscultate bilaterally to the bases    Cardiovascular: Auscultation reveals regular rate and rhythm, no murmurs, gallops, or rubs. Abdomen: Morbidly obese,  No palpable masses or ascites. Non tender to deep palpation throughout abdominal cavity. No detectable organomegaly. No inguinal lymphadenopathy bilaterally. Healing skin changes on mons pubis from prior likely folliculitis. Extremities: No edema, deformities, or calf tenderness bilaterally    Pelvic Exam using a large speculum reveals no blood or discharge in the vaginal canal. The cervix is visualized and IUD strings are present. No odor or foul discharge. The cervix was then cleansed with betadine and a single tooth tenaculum was used on the anterior cervix. The endometrial Pipelle was then advanced and the endometrium was sounded to 7 cm. Pass was made with Pipelle and tissue was obtained. The tenaculum was removed and direct pressure was applied. Adequate hemostasis was achieved. The patient tolerated without incident. Bimanual examination reveals a smooth vaginal canal, no palpable nodule or firmness noted. The IUD strings are palpable and the cervix is noted to be without firmness or nodularity. Uterus is normal in size and contour, mobile. No palpable adnexal pathology. Lab Results   Component Value Date     6 08/07/2020       Assessment:  Cancer Staging  Endometrial cancer [facility])  Staging form: Corpus Uteri - Carcinoma And Carcinosarcoma, AJCC 8th Edition  - Clinical stage from 9/17/2020: FIGO Stage I (cT1, cN0, cM0) - Signed by Rashaun Freeman MD on 9/17/2020  Staging comments: The patient is a young infertility patient and did not wish to have hysterectomy. She was therefore staged only clinically. An MRI of the pelvis was performed.       1. Endometrial cancer ([address] 2. IUD (intrauterine device) in place    3. Bacterial folliculitis    4. Personal history of ongoing treatment with hormonal therapy        Plan:  1. Will await endometrial biopsy taken today    2. Advised to inquire with PCP about hospital based weight management program or dietician couseling    3. Prescription sent for topical use of antibiotic therapy as needed in the future    For patient's who underwent a biopsy: patient is counseled on signs and symptoms to notify office which include increased vaginal bleeding or discharge or a temperature of 100.4 degrees F or higher. Follow Up:  1. Plan to return to clinic in 3 months for pelvic examination and repeat endometrial biopsy. We re-discussed the care plan that we will want three consecutive negative endometrial biopsies prior to removal of IUD and achieving a pregnancy. She is to still continue follow up with Dr. Emma Diane as intended. I spent 30 minutes providing care to the patient and >50% of the time was spent counseling and conoordinating care, discussing the patient's current situation, reviewing her options, counseling and education her and answering her questions. All of the patient's pertinent images, labs and previous records and procedures were reviewed.     Electronically signed by Abigail Osborne PA-C on 3/25/2021 at 4:00 PM EDT

## 2021-03-25 NOTE — PATIENT INSTRUCTIONS
Will await biopsy taken today and notify accordingly    Plan to follow up for repeat biopsy in 3 months with transvaginal ultrasound prior     Call or return to clinic sooner with any questions or concerns

## 2021-03-25 NOTE — PROGRESS NOTES
Rafat Hughes (:  1990) is a 27 y.o. female,{New vs Established:[id number]::\"Established patient\"}, here for evaluation of the following chief complaint(s):  Follow-up (3 month endometrial suveillance; US prior is done; endometrial biospy)      ASSESSMENT/PLAN:  1. Endometrial cancer ([facility] [address]  -     POCT urine pregnancy  -     Surgical Pathology; Future  -     US NON OB TRANSVAGINAL; Future  -     US PELVIS COMPLETE; Future  2. IUD (intrauterine device) in place  -     Surgical Pathology; Future      Return in about 3 months (around 2021). SUBJECTIVE/OBJECTIVE:  HPI    Review of Systems    Physical Exam      {Time Documentation Optional:[id number]}      An electronic signature was used to authenticate this note.     --Wilfrido Dodge MA

## 2021-03-29 LAB — SURGICAL PATHOLOGY REPORT: NORMAL

## 2021-07-09 ENCOUNTER — HOSPITAL ENCOUNTER (OUTPATIENT)
Dept: ULTRASOUND IMAGING | Age: 31
Discharge: HOME | End: 2021-07-11
Payer: COMMERCIAL

## 2021-07-09 DIAGNOSIS — C54.1 ENDOMETRIAL CANCER (HCC): ICD-10-CM

## 2021-07-09 PROCEDURE — 76856 US EXAM PELVIC COMPLETE: CPT

## 2021-07-09 PROCEDURE — 76830 TRANSVAGINAL US NON-OB: CPT

## 2021-07-15 ENCOUNTER — OFFICE VISIT (OUTPATIENT)
Dept: GYNECOLOGIC ONCOLOGY | Age: 31
End: 2021-07-15
Payer: COMMERCIAL

## 2021-07-15 ENCOUNTER — HOSPITAL ENCOUNTER (OUTPATIENT)
Age: 31
Setting detail: SPECIMEN
Discharge: HOME | End: 2021-07-15
Payer: COMMERCIAL

## 2021-07-15 VITALS
HEIGHT: 65 IN | WEIGHT: 293 LBS | DIASTOLIC BLOOD PRESSURE: 92 MMHG | TEMPERATURE: 96.9 F | SYSTOLIC BLOOD PRESSURE: 148 MMHG | BODY MASS INDEX: 48.82 KG/M2 | HEART RATE: 67 BPM | OXYGEN SATURATION: 96 %

## 2021-07-15 DIAGNOSIS — Z97.5 IUD (INTRAUTERINE DEVICE) IN PLACE: ICD-10-CM

## 2021-07-15 DIAGNOSIS — C54.1 ENDOMETRIAL CANCER (HCC): Primary | ICD-10-CM

## 2021-07-15 LAB
CONTROL: NORMAL
PREGNANCY TEST URINE, POC: NEGATIVE

## 2021-07-15 PROCEDURE — 99214 OFFICE O/P EST MOD 30 MIN: CPT | Performed by: PHYSICIAN ASSISTANT

## 2021-07-15 PROCEDURE — 81025 URINE PREGNANCY TEST: CPT | Performed by: PHYSICIAN ASSISTANT

## 2021-07-15 RX ORDER — MINOCYCLINE HYDROCHLORIDE 100 MG/1
CAPSULE ORAL
COMMUNITY
Start: 2021-07-12

## 2021-07-15 NOTE — PATIENT INSTRUCTIONS
Return to clinic in 3 months after bariatric surgery    Call or return to clinic sooner with any questions or concerns

## 2021-07-15 NOTE — PROGRESS NOTES
[address] [address], [id number] Adela Poe Mc is a 27 y.o. female who presents for her endometrial cancer surveillance visit    Chief Complaint:  Endometrial cancer    HPI: She is a pleasant  female who was initially following with her local OB/GYN for long standing history of amenorrhea. She was then dounf to have complex endometrial hyperplasia with atypia on D&C in 2019. She had a long standing history of abnormal menstruation, and amenorrhea. She was advised to have a pelvic ultrasound which was obtained on 2020 and revealed a thickened endometrial stripe at 1.6 cm. Therefore she was advised to have a pelvic MRI and plan for a repeat D&C and progesterone IUD. It was discussed in detail at this time that the patient was desiring future fertility. She wanted to approach her symptoms and diagnoses with fertility-sparing measures. It was also advised that she begin the work up and process for infertility and weight management to aid in her desires. Her preoperative  was within normal limits at 6 units on 2020. Pelvic MRI on 2020 for further evaluation of the uterus measuring 3.6 x 4.7 x 7.1 cm, the endometrial measures 18 mm in AP thickness with cystic changes noted. No evidence of invasive disease. She was then taken to the OR on 2020 for a D&C and Mirena IUD placement. Final pathology revealed a well-differentiated endometrioid carcinoma, FIGO grade 1 in the background of atypical complex hyperplasia. Discussion was then held regarding the pathologic diagnosis and since the patient is desiring fertility and the MRI was negative for advanced disease, shared decision making was held for the patient to be medically managed with progesterone IUD. She was advised to have serial transvaginal ultrasound and endometrial biopsies every 3 months for 3 consecutive negative biopsy results.     She underwent a pelvic ultrasound was performed on 12/16/2020 which has revealed a decrease in her endometrial stripe, now measuring 8.9 mm. Uterus measures 7.6 x 4.3 x 4.7 cm, with normal myometrial echo texture. IUD was noted to be the appropriate position. No adnexal pathology. Follow up endometrial biopsy performed on 12/18/2020 revealed endometrial incision with hormone effect, negative for residual atypical glandular proliferation or malignancy. Follow up pelvic ultrasound performed on 3/22/2021 revealed the uterus measuring 6.7 x 4.8 x 3.4 cm. Her endometrial stripe is now measuring 1.1 cm, within normal limits for premenopausal female. The position of the IUD in the cavity. Bilateral ovaries within normal limits. There is no evidence of free fluid. Endometrial biopsy obtained on 3/25/21 was negative for atypical hyperplasia or malignancy. Most recent pelvic ultrasound on 7/9/21 revealed uterus measuring 7.1 x 5.3 x  4.0 cm, endometrial stripe 1.7 cm, upper limits of normal. Bilateral ovaries within normal limits. Today, she is doing well. She notes a recent diagnosis of hidradenitis by dermatology. She has been placed on minocycline. She states that she has been following with the [facility] bariatric program (records in [facility]). She is working on obtaining all of her clearances for her bariatric surgery. She is hopeful her surgery will be performed in the next 2 months. She denies any abdominal or pelvic pain. She has no uterine bleeding. She has no vaginal discharge or irritation. She has normal urination and bowel habits. She had recent follow up with her PCP at the end of May for clearance for bariatric surgery. Patient states she discussed high blood pressure readings and asked pt to monitor as it seems to be \"white coat syndrome\". She denies chest pain or shortness of breath. She denies headache or vision changes.      She is up to date on pap smear obtained 7/29/2020 was satisfactory and negative for intraepithelial lesion or malignancy. HPV negative June 2019. Review of Systems  I have personally reviewed and agree with the review of the systems done by my ancillary staff in the [facility] documentation. Objective:  Vitals:    07/15/21 1325 07/15/21 1400   BP: (!) 177/113 (!) 148/92   Pulse: 67    Temp: 96.9 °F (36.1 °C)    TempSrc: Temporal    SpO2: 96%    Weight: (!) 325 lb 12.8 oz (147.8 kg)    Height: 5' 4.5\" (1.638 m)        Physical Exam   General: well appearing, alert and oriented x 3, no acute distress. HEENT: No cervical or supraclavicular lymphadenopathy. No thyromegaly. Lungs: Clear to auscultate bilaterally to the bases    Cardiovascular: Auscultation reveals regular rate and rhythm, no murmurs, gallops, or rubs. Abdomen: Morbidly obese,  No palpable masses or ascites. Non tender to deep palpation throughout abdominal cavity. No detectable organomegaly. No inguinal lymphadenopathy bilaterally. Almost completely resolution of prior hidradenitis on mons pubis. Extremities: No edema, deformities, or calf tenderness bilaterally    Pelvic Exam using a large speculum reveals no blood or discharge in the vaginal canal. The cervix is visualized and IUD strings are present. No odor or foul discharge. The cervix was then cleansed with betadine and a single tooth tenaculum was used on the anterior cervix. The endometrial Pipelle was then advanced and the endometrium was sounded to 7 cm. Pass was made with Pipelle and tissue was obtained. The tenaculum was removed and direct pressure was applied. Adequate hemostasis was achieved. The patient tolerated without incident. Bimanual examination reveals a smooth vaginal canal, no palpable nodule or firmness noted. The IUD strings are palpable and the cervix is noted to be without firmness or nodularity. Uterus is normal in size and contour, mobile. No palpable adnexal pathology.      Lab Results procedures were reviewed.     Electronically signed by Diaz Anderson PA-C on 7/15/2021 at 2:00 PM EDT

## 2021-07-19 LAB — SURGICAL PATHOLOGY REPORT: NORMAL

## 2021-07-20 DIAGNOSIS — C54.1 ENDOMETRIAL CANCER (HCC): Primary | ICD-10-CM

## 2021-07-20 DIAGNOSIS — Z97.5 IUD (INTRAUTERINE DEVICE) IN PLACE: ICD-10-CM

## 2021-10-12 ENCOUNTER — HOSPITAL ENCOUNTER (OUTPATIENT)
Dept: ULTRASOUND IMAGING | Age: 31
Discharge: HOME | End: 2021-10-14
Payer: COMMERCIAL

## 2021-10-12 DIAGNOSIS — C54.1 ENDOMETRIAL CANCER (HCC): ICD-10-CM

## 2021-10-12 DIAGNOSIS — Z97.5 IUD (INTRAUTERINE DEVICE) IN PLACE: ICD-10-CM

## 2021-10-12 PROCEDURE — 76830 TRANSVAGINAL US NON-OB: CPT

## 2021-10-29 ENCOUNTER — OFFICE VISIT (OUTPATIENT)
Dept: GYNECOLOGIC ONCOLOGY | Age: 31
End: 2021-10-29
Payer: COMMERCIAL

## 2021-10-29 ENCOUNTER — HOSPITAL ENCOUNTER (OUTPATIENT)
Age: 31
Setting detail: SPECIMEN
Discharge: HOME | End: 2021-10-29
Payer: COMMERCIAL

## 2021-10-29 VITALS
SYSTOLIC BLOOD PRESSURE: 153 MMHG | HEART RATE: 81 BPM | OXYGEN SATURATION: 96 % | TEMPERATURE: 98.1 F | BODY MASS INDEX: 50.02 KG/M2 | HEIGHT: 64 IN | DIASTOLIC BLOOD PRESSURE: 106 MMHG | WEIGHT: 293 LBS

## 2021-10-29 DIAGNOSIS — Z97.5 IUD (INTRAUTERINE DEVICE) IN PLACE: ICD-10-CM

## 2021-10-29 DIAGNOSIS — C54.1 ENDOMETRIAL CANCER (HCC): Primary | ICD-10-CM

## 2021-10-29 PROCEDURE — 99214 OFFICE O/P EST MOD 30 MIN: CPT | Performed by: PHYSICIAN ASSISTANT

## 2021-10-29 ASSESSMENT — ENCOUNTER SYMPTOMS
EYES NEGATIVE: 1
GASTROINTESTINAL NEGATIVE: 1
RESPIRATORY NEGATIVE: 1

## 2021-10-29 NOTE — PATIENT INSTRUCTIONS
Will notify view of endometrial biopsy results once available    Return to clinic in 3 months with pelvic ultrasound prior    Return to clinic sooner with any questions or concerns

## 2021-10-29 NOTE — PROGRESS NOTES
[address] [address], [phone] Adela Monae is a 27 y.o. female who presents for her endometrial cancer surveillance visit    Chief Complaint:  Endometrial cancer    HPI: She is a pleasant  female who was initially following with her local OB/GYN for long standing history of amenorrhea. She was then found to have complex endometrial hyperplasia with atypia on D&C in 2019. She had a long standing history of abnormal menstruation, and amenorrhea. She was advised to have a pelvic ultrasound which was obtained on 2020 and revealed a thickened endometrial stripe at 1.6 cm. It was discussed in detail at this time that the patient was desiring future fertility. She wanted to approach her symptoms and diagnoses with fertility-sparing measures. It was also advised that she begin the work up and process for infertility and weight management to aid in her desires. Therefore recommendations for fertility sparing treatment include hormonal therapy. Pt was advised to have a pelvic MRI and plan for a repeat D&C and progesterone IUD.  was within normal limits at 6 units on 2020. Pelvic MRI on 2020 for further evaluation of the uterus measuring 3.6 x 4.7 x 7.1 cm, the endometrial measures 18 mm in AP thickness with cystic changes noted. No evidence of invasive disease. She was then taken to the OR on 2020 for a D&C and Mirena IUD placement. Final pathology revealed a well-differentiated endometrioid carcinoma, FIGO grade 1 in the background of atypical complex hyperplasia. She was advised to have serial transvaginal ultrasound and endometrial biopsies every 3 months for 3 consecutive negative biopsy results. She underwent a pelvic ultrasound was performed on 2020 which has revealed a decrease in her endometrial stripe, now measuring 8.9 mm.  Uterus measures 7.6 x 4.3 x 4.7 cm, with normal myometrial echo texture. IUD was noted to be the appropriate position. No adnexal pathology. Follow up endometrial biopsy performed on 12/18/2020 revealed endometrial incision with hormone effect, negative for residual atypical glandular proliferation or malignancy. A pelvic ultrasound performed on 3/22/2021 revealed the uterus measuring 6.7 x 4.8 x 3.4 cm. Her endometrial stripe is now measuring 1.1 cm, within normal limits for premenopausal female. The position of the IUD in the cavity. Bilateral ovaries within normal limits. There is no evidence of free fluid. Endometrial biopsy obtained on 3/25/21 was negative for atypical hyperplasia or malignancy. Follow up pelvic ultrasound on 7/9/21 revealed uterus measuring 7.1 x 5.3 x 4.0 cm, endometrial stripe 1.7 cm, upper limits of normal. Bilateral ovaries within normal limits. Repeat endometrial biopsy on 7/15/21 revealed small foci of residual complex atypical gland most compatible with residual endometrioid adenocarcinoma. She was counsled to continue progesterone treatment as patient continues to desire fertility and has been following with the [facility] bariatric program for surgery. Most recent pelvic ultrasound measured 7.2 x 3.5 x 5.3 cm the endometrial stripe is 7.2 mm. IUD noted. Bilateral ovaries within normal limits. Today, she denies any abdominal or pelvic pain. She states she had one menstrual cycle in September. She has no vaginal discharge or irritation. She has normal urination and bowel habits. She denies chest pain or shortness of breath. She is up to date on pap smear obtained 7/29/2020 was satisfactory and negative for intraepithelial lesion or malignancy. HPV negative June 2019. Review of Systems  I have personally reviewed and agree with the review of the systems done by my ancillary staff in the [facility] documentation.     Objective:  Vitals:    10/29/21 1419 10/29/21 1425   BP: (!) 171/107 (!) 153/106   Site: Left Lower Arm    Position: Sitting    Cuff Size: Medium Adult    Pulse: 81    Temp: 98.1 °F (36.7 °C)    SpO2: 96%    Weight: (!) 332 lb (150.6 kg)    Height: 5' 4\" (1.626 m)        Physical Exam   General: well appearing, alert and oriented x 3, no acute distress. HEENT: No cervical or supraclavicular lymphadenopathy. No thyromegaly. Lungs: Clear to auscultate bilaterally to the bases    Cardiovascular: Auscultation reveals regular rate and rhythm, no murmurs, gallops, or rubs. Abdomen: Morbidly obese,  No palpable masses or ascites. Non tender to deep palpation throughout abdominal cavity. No detectable organomegaly. No inguinal lymphadenopathy bilaterally. Extremities: No edema, deformities, or calf tenderness bilaterally    Pelvic Exam using a large speculum reveals no blood or discharge in the vaginal canal. The cervix is visualized and IUD strings are present. No odor or foul discharge. The cervix was then cleansed with betadine and a single tooth tenaculum was used on the anterior cervix. The endometrial Pipelle was then advanced and the endometrium was sounded to 7 cm. Pass was made with Pipelle and tissue was obtained. The tenaculum was removed and direct pressure was applied. Adequate hemostasis was achieved. The patient tolerated without incident. Bimanual examination reveals a smooth vaginal canal, no palpable nodule or firmness noted. The IUD strings are palpable and the cervix is noted to be without firmness or nodularity. Limited evaluation of uterus secondary to patient habitus however no palpable masses or enlarged pathology. No palpable adnexal pathology.      Lab Results   Component Value Date     6 08/07/2020       Assessment:  Cancer Staging  Endometrial cancer [facility])  Staging form: Corpus Uteri - Carcinoma And Carcinosarcoma, AJCC 8th Edition  - Clinical stage from 9/17/2020: FIGO Stage I (cT1, cN0, cM0) - Signed by Cirilo Johnson MD on 9/17/2020  Staging comments: The patient is a young infertility patient and did not wish to have hysterectomy. She was therefore staged only clinically. An MRI of the pelvis was performed. 1. Endometrial cancer ([address]    2. IUD (intrauterine device) in place        Plan:  1. Will await endometrial biopsy taken today and notify patient of results accordingly    We then held a discussion regarding her management plan and patient is hopeful to have her bariatrics procedure performed by the end of the calendar year. This has been dependent on Covid scheduling. She has been advised to avoid pregnancy for 18 months in the postoperative period by her bariatric surgical team.  This has previously been discussed with Dr. Adryan Rock and the plan is to remain in close contact with the patient with often surveillance using pelvic ultrasounds and endometrial biopsies. She understands that we may need to proceed further if there are to be acute changes on her imaging/symptomology or endometrial biopsies. She inquires on management options if \"fertility is not her main concern anymore\". Discussed that if patient is definitively she is no longer interested in fertility then the treatment recommendations would be hysterectomy. Reviewed NCCN guidelines for fertility sparing well-differentiated endometrioid adenocarcinoma which include management with progesterone therapy and if carcinoma still present in 6 to 12 months then commendations include BANDAR/BSO with staging. These are guidelines and each patient is specific and we would not proceed with this plan without patient's consent for sterilization. We also discussed that surgery may still be the management recommendations if there are new changes on her pathology or imaging to suggest progression of disease.     At this point time we will continue to manage her conservatively with a progesterone IUD treatment as long as her is no evidence of progressive disease until the patient is able to modify her lifestyle and make decision on future fertility.     Electronically signed by Debbie De La Paz PA-C on 10/29/2021 at 3:00 PM EDT

## 2021-11-02 LAB — SURGICAL PATHOLOGY REPORT: NORMAL

## 2021-11-12 ENCOUNTER — HOSPITAL ENCOUNTER (OUTPATIENT)
Age: 31
Discharge: HOME | End: 2021-11-12
Payer: COMMERCIAL

## 2021-11-12 LAB
-: NORMAL
ABSOLUTE EOS #: 0.25 K/UL (ref 0–0.44)
ABSOLUTE IMMATURE GRANULOCYTE: 0.04 K/UL (ref 0–0.3)
ABSOLUTE LYMPH #: 2.14 K/UL (ref 1.1–3.7)
ABSOLUTE MONO #: 0.69 K/UL (ref 0.1–1.2)
ALBUMIN SERPL-MCNC: 4.5 G/DL (ref 3.5–5.2)
ALBUMIN/GLOBULIN RATIO: 1.4 (ref 1–2.5)
ALP BLD-CCNC: 63 U/L (ref 35–104)
ALT SERPL-CCNC: 38 U/L (ref 5–33)
AMORPHOUS: NORMAL
ANION GAP SERPL CALCULATED.3IONS-SCNC: 12 MMOL/L (ref 9–17)
AST SERPL-CCNC: 28 U/L
BACTERIA: NORMAL
BASOPHILS # BLD: 1 % (ref 0–2)
BASOPHILS ABSOLUTE: 0.06 K/UL (ref 0–0.2)
BILIRUB SERPL-MCNC: 0.52 MG/DL (ref 0.3–1.2)
BILIRUBIN URINE: NEGATIVE
BUN BLDV-MCNC: 12 MG/DL (ref 6–20)
BUN/CREAT BLD: 16 (ref 9–20)
CALCIUM SERPL-MCNC: 9.2 MG/DL (ref 8.6–10.4)
CASTS UA: NORMAL /LPF
CHLORIDE BLD-SCNC: 102 MMOL/L (ref 98–107)
CHOLESTEROL/HDL RATIO: 3.8
CHOLESTEROL: 174 MG/DL
CO2: 23 MMOL/L (ref 20–31)
COLOR: YELLOW
COMMENT UA: ABNORMAL
CREAT SERPL-MCNC: 0.75 MG/DL (ref 0.5–0.9)
CREATININE URINE: 261.9 MG/DL (ref 28–217)
CRYSTALS, UA: NORMAL /HPF
DIFFERENTIAL TYPE: ABNORMAL
EOSINOPHILS RELATIVE PERCENT: 2 % (ref 1–4)
EPITHELIAL CELLS UA: NORMAL /HPF (ref 0–25)
GFR AFRICAN AMERICAN: >60 ML/MIN
GFR NON-AFRICAN AMERICAN: >60 ML/MIN
GFR SERPL CREATININE-BSD FRML MDRD: ABNORMAL ML/MIN/{1.73_M2}
GFR SERPL CREATININE-BSD FRML MDRD: ABNORMAL ML/MIN/{1.73_M2}
GLUCOSE BLD-MCNC: 117 MG/DL (ref 70–99)
GLUCOSE URINE: NEGATIVE
HCT VFR BLD CALC: 42.3 % (ref 36.3–47.1)
HDLC SERPL-MCNC: 46 MG/DL
HEMOGLOBIN: 13.7 G/DL (ref 11.9–15.1)
IMMATURE GRANULOCYTES: 0 %
KETONES, URINE: NEGATIVE
LDL CHOLESTEROL: 109 MG/DL (ref 0–130)
LEUKOCYTE ESTERASE, URINE: NEGATIVE
LYMPHOCYTES # BLD: 20 % (ref 24–43)
MCH RBC QN AUTO: 25.8 PG (ref 25.2–33.5)
MCHC RBC AUTO-ENTMCNC: 32.4 G/DL (ref 28.4–34.8)
MCV RBC AUTO: 79.8 FL (ref 82.6–102.9)
MICROALBUMIN/CREAT 24H UR: 42 MG/L
MICROALBUMIN/CREAT UR-RTO: 16 MCG/MG CREAT
MONOCYTES # BLD: 7 % (ref 3–12)
MUCUS: NORMAL
NITRITE, URINE: NEGATIVE
NRBC AUTOMATED: 0 PER 100 WBC
OTHER OBSERVATIONS UA: NORMAL
PDW BLD-RTO: 13.3 % (ref 11.8–14.4)
PH UA: 6 (ref 5–9)
PLATELET # BLD: 311 K/UL (ref 138–453)
PLATELET ESTIMATE: ABNORMAL
PMV BLD AUTO: 10.9 FL (ref 8.1–13.5)
POTASSIUM SERPL-SCNC: 4 MMOL/L (ref 3.7–5.3)
PROTEIN UA: NEGATIVE
RBC # BLD: 5.3 M/UL (ref 3.95–5.11)
RBC # BLD: ABNORMAL 10*6/UL
RBC UA: NORMAL /HPF (ref 0–2)
RENAL EPITHELIAL, UA: NORMAL /HPF
SEG NEUTROPHILS: 70 % (ref 36–65)
SEGMENTED NEUTROPHILS ABSOLUTE COUNT: 7.4 K/UL (ref 1.5–8.1)
SODIUM BLD-SCNC: 137 MMOL/L (ref 135–144)
SPECIFIC GRAVITY UA: >1.03 (ref 1.01–1.02)
TOTAL PROTEIN: 7.8 G/DL (ref 6.4–8.3)
TRICHOMONAS: NORMAL
TRIGL SERPL-MCNC: 94 MG/DL
TSH SERPL DL<=0.05 MIU/L-ACNC: 2.71 MIU/L (ref 0.3–5)
TURBIDITY: CLEAR
URINE HGB: NEGATIVE
UROBILINOGEN, URINE: NORMAL
VLDLC SERPL CALC-MCNC: NORMAL MG/DL (ref 1–30)
WBC # BLD: 10.6 K/UL (ref 3.5–11.3)
WBC # BLD: ABNORMAL 10*3/UL
WBC UA: NORMAL /HPF (ref 0–5)
YEAST: NORMAL

## 2021-11-12 PROCEDURE — 80061 LIPID PANEL: CPT

## 2021-11-12 PROCEDURE — 82570 ASSAY OF URINE CREATININE: CPT

## 2021-11-12 PROCEDURE — 36415 COLL VENOUS BLD VENIPUNCTURE: CPT

## 2021-11-12 PROCEDURE — 80053 COMPREHEN METABOLIC PANEL: CPT

## 2021-11-12 PROCEDURE — 85025 COMPLETE CBC W/AUTO DIFF WBC: CPT

## 2021-11-12 PROCEDURE — 81001 URINALYSIS AUTO W/SCOPE: CPT

## 2021-11-12 PROCEDURE — 84443 ASSAY THYROID STIM HORMONE: CPT

## 2021-11-12 PROCEDURE — 82043 UR ALBUMIN QUANTITATIVE: CPT

## 2022-01-05 ENCOUNTER — HOSPITAL ENCOUNTER (EMERGENCY)
Age: 32
Discharge: HOME | End: 2022-01-05
Payer: COMMERCIAL

## 2022-01-05 ENCOUNTER — APPOINTMENT (OUTPATIENT)
Dept: CT IMAGING | Age: 32
End: 2022-01-05
Payer: COMMERCIAL

## 2022-01-05 ENCOUNTER — APPOINTMENT (OUTPATIENT)
Dept: GENERAL RADIOLOGY | Age: 32
End: 2022-01-05
Payer: COMMERCIAL

## 2022-01-05 VITALS
TEMPERATURE: 98.7 F | DIASTOLIC BLOOD PRESSURE: 101 MMHG | RESPIRATION RATE: 21 BRPM | OXYGEN SATURATION: 97 % | SYSTOLIC BLOOD PRESSURE: 158 MMHG | HEART RATE: 75 BPM

## 2022-01-05 DIAGNOSIS — J18.9 PNEUMONIA OF LEFT LUNG DUE TO INFECTIOUS ORGANISM, UNSPECIFIED PART OF LUNG: Primary | ICD-10-CM

## 2022-01-05 LAB
ABSOLUTE EOS #: 0.36 K/UL (ref 0–0.44)
ABSOLUTE IMMATURE GRANULOCYTE: 0.04 K/UL (ref 0–0.3)
ABSOLUTE LYMPH #: 2.22 K/UL (ref 1.1–3.7)
ABSOLUTE MONO #: 0.72 K/UL (ref 0.1–1.2)
ANION GAP SERPL CALCULATED.3IONS-SCNC: 12 MMOL/L (ref 9–17)
BASOPHILS # BLD: 1 % (ref 0–2)
BASOPHILS ABSOLUTE: 0.07 K/UL (ref 0–0.2)
BILIRUBIN URINE: NEGATIVE
BUN BLDV-MCNC: 23 MG/DL (ref 6–20)
BUN/CREAT BLD: 31 (ref 9–20)
CALCIUM SERPL-MCNC: 9.8 MG/DL (ref 8.6–10.4)
CHLORIDE BLD-SCNC: 105 MMOL/L (ref 98–107)
CO2: 24 MMOL/L (ref 20–31)
COLOR: YELLOW
COMMENT UA: ABNORMAL
CREAT SERPL-MCNC: 0.74 MG/DL (ref 0.5–0.9)
D-DIMER QUANTITATIVE: <0.27 MG/L FEU (ref 0–0.59)
DIFFERENTIAL TYPE: ABNORMAL
EKG ATRIAL RATE: 75 BPM
EKG P AXIS: 12 DEGREES
EKG P-R INTERVAL: 152 MS
EKG Q-T INTERVAL: 392 MS
EKG QRS DURATION: 94 MS
EKG QTC CALCULATION (BAZETT): 437 MS
EKG R AXIS: -11 DEGREES
EKG T AXIS: 14 DEGREES
EKG VENTRICULAR RATE: 75 BPM
EOSINOPHILS RELATIVE PERCENT: 3 % (ref 1–4)
GFR AFRICAN AMERICAN: >60 ML/MIN
GFR NON-AFRICAN AMERICAN: >60 ML/MIN
GFR SERPL CREATININE-BSD FRML MDRD: ABNORMAL ML/MIN/{1.73_M2}
GFR SERPL CREATININE-BSD FRML MDRD: ABNORMAL ML/MIN/{1.73_M2}
GLUCOSE BLD-MCNC: 102 MG/DL (ref 70–99)
GLUCOSE URINE: NEGATIVE
HCG(URINE) PREGNANCY TEST: NEGATIVE
HCT VFR BLD CALC: 41.3 % (ref 36.3–47.1)
HEMOGLOBIN: 13.2 G/DL (ref 11.9–15.1)
IMMATURE GRANULOCYTES: 0 %
INR BLD: 1.1
KETONES, URINE: NEGATIVE
LEUKOCYTE ESTERASE, URINE: NEGATIVE
LYMPHOCYTES # BLD: 21 % (ref 24–43)
MCH RBC QN AUTO: 26 PG (ref 25.2–33.5)
MCHC RBC AUTO-ENTMCNC: 32 G/DL (ref 28.4–34.8)
MCV RBC AUTO: 81.5 FL (ref 82.6–102.9)
MONOCYTES # BLD: 7 % (ref 3–12)
NITRITE, URINE: NEGATIVE
NRBC AUTOMATED: 0 PER 100 WBC
PDW BLD-RTO: 13.9 % (ref 11.8–14.4)
PH UA: 6 (ref 5–9)
PLATELET # BLD: 280 K/UL (ref 138–453)
PLATELET ESTIMATE: ABNORMAL
PMV BLD AUTO: 11 FL (ref 8.1–13.5)
POTASSIUM SERPL-SCNC: 4.3 MMOL/L (ref 3.7–5.3)
PROTEIN UA: NEGATIVE
PROTHROMBIN TIME: 14 SEC (ref 11.5–14.2)
RBC # BLD: 5.07 M/UL (ref 3.95–5.11)
RBC # BLD: ABNORMAL 10*6/UL
SARS-COV-2, RAPID: NOT DETECTED
SEG NEUTROPHILS: 68 % (ref 36–65)
SEGMENTED NEUTROPHILS ABSOLUTE COUNT: 7.19 K/UL (ref 1.5–8.1)
SODIUM BLD-SCNC: 141 MMOL/L (ref 135–144)
SPECIFIC GRAVITY UA: >1.03 (ref 1.01–1.02)
SPECIMEN DESCRIPTION: NORMAL
TROPONIN INTERP: NORMAL
TROPONIN T: NORMAL NG/ML
TROPONIN, HIGH SENSITIVITY: <6 NG/L (ref 0–14)
TURBIDITY: CLEAR
URINE HGB: NEGATIVE
UROBILINOGEN, URINE: NORMAL
WBC # BLD: 10.6 K/UL (ref 3.5–11.3)
WBC # BLD: ABNORMAL 10*3/UL

## 2022-01-05 PROCEDURE — 85025 COMPLETE CBC W/AUTO DIFF WBC: CPT

## 2022-01-05 PROCEDURE — 71260 CT THORAX DX C+: CPT

## 2022-01-05 PROCEDURE — 85610 PROTHROMBIN TIME: CPT

## 2022-01-05 PROCEDURE — 71045 X-RAY EXAM CHEST 1 VIEW: CPT

## 2022-01-05 PROCEDURE — 81003 URINALYSIS AUTO W/O SCOPE: CPT

## 2022-01-05 PROCEDURE — 84484 ASSAY OF TROPONIN QUANT: CPT

## 2022-01-05 PROCEDURE — 99282 EMERGENCY DEPT VISIT SF MDM: CPT

## 2022-01-05 PROCEDURE — 85379 FIBRIN DEGRADATION QUANT: CPT

## 2022-01-05 PROCEDURE — 81025 URINE PREGNANCY TEST: CPT

## 2022-01-05 PROCEDURE — 93010 ELECTROCARDIOGRAM REPORT: CPT | Performed by: INTERNAL MEDICINE

## 2022-01-05 PROCEDURE — 80048 BASIC METABOLIC PNL TOTAL CA: CPT

## 2022-01-05 PROCEDURE — 36415 COLL VENOUS BLD VENIPUNCTURE: CPT

## 2022-01-05 PROCEDURE — 6360000004 HC RX CONTRAST MEDICATION: Performed by: NURSE PRACTITIONER

## 2022-01-05 PROCEDURE — 93005 ELECTROCARDIOGRAM TRACING: CPT | Performed by: NURSE PRACTITIONER

## 2022-01-05 PROCEDURE — 87635 SARS-COV-2 COVID-19 AMP PRB: CPT

## 2022-01-05 RX ORDER — CEPHALEXIN 500 MG/1
500 CAPSULE ORAL 3 TIMES DAILY
Qty: 30 CAPSULE | Refills: 0 | Status: SHIPPED | OUTPATIENT
Start: 2022-01-05 | End: 2022-01-15

## 2022-01-05 RX ORDER — AZITHROMYCIN 250 MG/1
250 TABLET, FILM COATED ORAL SEE ADMIN INSTRUCTIONS
Qty: 6 TABLET | Refills: 0 | Status: SHIPPED | OUTPATIENT
Start: 2022-01-05 | End: 2022-01-10

## 2022-01-05 RX ADMIN — IOPAMIDOL 75 ML: 755 INJECTION, SOLUTION INTRAVENOUS at 13:35

## 2022-01-05 ASSESSMENT — PAIN DESCRIPTION - ORIENTATION: ORIENTATION: LEFT

## 2022-01-05 ASSESSMENT — PAIN SCALES - GENERAL: PAINLEVEL_OUTOF10: 7

## 2022-01-05 ASSESSMENT — PAIN DESCRIPTION - FREQUENCY: FREQUENCY: CONTINUOUS

## 2022-01-05 ASSESSMENT — PAIN DESCRIPTION - DESCRIPTORS: DESCRIPTORS: HEAVINESS

## 2022-01-05 ASSESSMENT — PAIN DESCRIPTION - PAIN TYPE: TYPE: ACUTE PAIN

## 2022-01-05 ASSESSMENT — PAIN DESCRIPTION - LOCATION: LOCATION: CHEST

## 2022-01-05 NOTE — Clinical Note
Neeru Toscano was seen and treated in our emergency department on 1/5/2022. She may return to work on 01/12/2022. If you have any questions or concerns, please don't hesitate to call.       Wanna Holter, APRN - CNP

## 2022-01-05 NOTE — Clinical Note
Severo Emmer was seen and treated in our emergency department on 1/5/2022. She may return to work on 01/12/2022. If you have any questions or concerns, please don't hesitate to call.       Gabby Azul, ERLINDA - CNP

## 2022-01-05 NOTE — ED PROVIDER NOTES
[address] [facility] [place] ED  EMERGENCY DEPARTMENT ENCOUNTER      Pt Name: Idania Chang  MRN: [id number]  [place] 1990  Date of evaluation: 1/5/2022  Provider: ERLINDA Garcia CNP    CHIEF COMPLAINT       Chief Complaint   Patient presents with    Chest Pain     chest tightness started yesterday 1600         HISTORY OF PRESENT ILLNESS   (Location/Symptom, Timing/Onset, Context/Setting, Quality, Duration, Modifying Factors, Severity)  Note limiting factors. Idania Chang is a 27 y.o. female with past medical history significant for morbid obesity, hypertension, endometrial cancer presents to the emergency department with complaints of chest tightness onset yesterday. She reports that the chest tightness is in her left upper chest.  She notes no measured fever. No headache or neck pain. Denies sore throat difficulty swallowing. Reports no cough or difficulty breathing. No abdominal pain, vomiting, diarrhea or dysuria. No known sick contacts. HPI    Nursing Notes were reviewed. REVIEW OF SYSTEMS    (2-9 systems for level 4, 10 or more for level 5)     Review of Systems    Except as noted above the remainder of the review of systems was reviewed and negative. PAST MEDICAL HISTORY     Past Medical History:   Diagnosis Date    Hypertension     KAYLA Mae    Obesity     PCOS (polycystic ovarian syndrome)     Snores     Wears contact lenses     Wears prescription eyeglasses     Wellness examination     KAYLA Lay-TK PCP last visit 7/28/2020-Melo         SURGICAL HISTORY       Past Surgical History:   Procedure Laterality Date    DILATION AND CURETTAGE OF UTERUS N/A 11/6/2019    DILATATION AND CURETTAGE HYSTEROSCOPY-POLYPECTOMY performed by Roland Luo MD at [address] [place] [address]  09/08/2020    DILATATION AND CURETTAGE, MIRENA PLACEMENT     DILATION AND CURETTAGE OF UTERUS N/A 9/8/2020    DILATATION AND CURETTAGE, MIRENA PLACEMENT performed by Jina Zamudio Veronika Pittman MD at [address] [place] [address] HYSTEROSCOPY      D&C with polypectomy         CURRENT MEDICATIONS       Previous Medications    FUROSEMIDE (LASIX) 20 MG TABLET    TAKE 1 TABLET BY MOUTH ONCE DAILY FOR 30 DAYS    HYDROXYZINE (VISTARIL) 25 MG CAPSULE    25 mg 2 times daily as needed     IBUPROFEN (ADVIL;MOTRIN) 200 MG TABLET    Take 200 mg by mouth every 6 hours as needed for Pain PT. Takes PRN    LABETALOL (NORMODYNE) 100 MG TABLET    Take 100 mg by mouth 2 times daily Pt is taking 50mg in the am and 50 mg at bedtime for a total of 100mg    METFORMIN (GLUCOPHAGE) 500 MG TABLET    TAKE ONE TABLET BY MOUTH THREE TIMES DAILY    MINOCYCLINE (MINOCIN;DYNACIN) 100 MG CAPSULE    TAKE 1 CAPSULE BY MOUTH TWICE DAILY    MULTIPLE VITAMINS-MINERALS (MULTI FOR HER PO)    Take 1 capsule by mouth daily    ONDANSETRON (ZOFRAN ODT) 4 MG DISINTEGRATING TABLET    Take 1 tablet by mouth every 8 hours as needed for Nausea or Vomiting    PRENATAL MULTIVIT-MIN-FE-FA (PRE-FANY FORMULA) TABS    Take 1 tablet by mouth daily       ALLERGIES     Adhesive tape    FAMILY HISTORY       Family History   Problem Relation Age of Onset    Cancer Mother     Cancer Father     Other Father         seeing a cardiologist for heart problems    Diabetes Maternal Grandmother     Heart Disease Maternal Grandmother     Cancer Paternal Grandmother     Cancer Paternal Grandfather           SOCIAL HISTORY       Social History     Socioeconomic History    Marital status: Single     Spouse name: Not on file    Number of children: Not on file    Years of education: Not on file    Highest education level: Not on file   Occupational History    Not on file   Tobacco Use    Smoking status: Never Smoker    Smokeless tobacco: Never Used   Vaping Use    Vaping Use: Never used   Substance and Sexual Activity    Alcohol use: Yes     Alcohol/week: 0.0 standard drinks     Types: 1 Glasses of wine, 1 Standard drinks or equivalent per week     Comment:  Occ    Drug use: No    Sexual activity: Yes     Partners: Male   Other Topics Concern    Not on file   Social History Narrative    Not on file     Social Determinants of Health     Financial Resource Strain:     Difficulty of Paying Living Expenses: Not on file   Food Insecurity:     Worried About Running Out of Food in the Last Year: Not on file    Sotero of Food in the Last Year: Not on file   Transportation Needs:     Lack of Transportation (Medical): Not on file    Lack of Transportation (Non-Medical): Not on file   Physical Activity:     Days of Exercise per Week: Not on file    Minutes of Exercise per Session: Not on file   Stress:     Feeling of Stress : Not on file   Social Connections:     Frequency of Communication with Friends and Family: Not on file    Frequency of Social Gatherings with Friends and Family: Not on file    Attends Scientologist Services: Not on file    Active Member of [address] [place] [address] [employer] Services or Organizations: Not on file    Attends Club or Organization Meetings: Not on file    Marital Status: Not on file   Intimate Partner Violence:     Fear of Current or Ex-Partner: Not on file    Emotionally Abused: Not on file    Physically Abused: Not on file    Sexually Abused: Not on file   Housing Stability:     Unable to Pay for Housing in the Last Year: Not on file    Number of [place] in the Last Year: Not on file    Unstable Housing in the Last Year: Not on file       SCREENINGS                               CIWA Assessment  BP: (!) 158/101  Pulse: 83                 PHYSICAL EXAM    (up to 7 for level 4, 8 or more for level 5)     ED Triage Vitals   BP Temp Temp Source Pulse Resp SpO2 Height Weight   01/05/22 1133 01/05/22 1131 01/05/22 1131 01/05/22 1131 01/05/22 1131 01/05/22 1131 -- --   (!) 158/101 98.7 °F (37.1 °C) Tympanic 83 18 97 %         Physical Exam  General: Well-developed, well-nourished, in no apparent distress. HEENT exam: Normocephalic, atraumatic.   Pupils equal round and reactive to light and external ocular muscles intact. Posterior pharynx noninjected. Oral airway widely patent. No exudate present. Neck exam: Supple no lymphadenopathy, trachea midline. Chest exam: No audible wheezing. No increased respiratory effort. Heart: Normal heart rate and rhythm. No murmur. Abdomen: Soft, nontender, nondistended. Back: No midline tenderness. No CVA tenderness. Extremities: Patient moving all extremities or difficulty. Intact distal pulses and sensation. Neurologic: Alert and oriented x3. Able to make informed decisions. Skin exam: Clean dry and intact. DIAGNOSTIC RESULTS     EKG: All EKG's are interpreted by the Emergency Department Physician who either signs or Co-signs this chart in the absence of a cardiologist.    Obtained, reviewed interpreted by myself revealing normal sinus rhythm with a ventricular rate of 75 bpm, WI interval 152 ms, QRS duration 94 ms and corrected QTC 4 3 7 ms. No ST segment elevation or depression identified. No STEMI. RADIOLOGY:   Non-plain film images such as CT, Ultrasound and MRI are read by the radiologist. Plain radiographic images are visualized and preliminarily interpreted by the emergency physician with the below findings:    Chest x-ray obtained, reviewed by myself and read by radiology with findings of diffuse reticular airspace opacities. These could represent interstitial pulmonary edema versus atypical viral infection. Interpretation per the Radiologist below, if available at the time of this note:    CT CHEST PULMONARY EMBOLISM W CONTRAST   Final Result   1. No pulmonary embolism. 2.  Mild patchy ground-glass attenuation within the left upper and left lower   lobe. This is nonspecific but could represent an atypical pneumonia as well   as acute hypersensitivity pneumonitis. RECOMMENDATIONS:   Unavailable         XR CHEST PORTABLE   Final Result   Diffuse reticular airspace opacities.   These could represent interstitial   pulmonary edema versus atypical/viral infection. ED BEDSIDE ULTRASOUND:   Performed by ED Physician - none    LABS:  Labs Reviewed   BASIC METABOLIC PANEL - Abnormal; Notable for the following components:       Result Value    Glucose 102 (*)     BUN 23 (*)     Bun/Cre Ratio 31 (*)     All other components within normal limits   CBC WITH AUTO DIFFERENTIAL - Abnormal; Notable for the following components:    MCV 81.5 (*)     Seg Neutrophils 68 (*)     Lymphocytes 21 (*)     All other components within normal limits   URINALYSIS - Abnormal; Notable for the following components:    Specific Gravity, UA >1.030 (*)     All other components within normal limits   COVID-19, RAPID   TROPONIN   PREGNANCY, URINE   D-DIMER, QUANTITATIVE   PROTIME-INR       All other labs were within normal range or not returned as of this dictation. EMERGENCY DEPARTMENT COURSE and DIFFERENTIAL DIAGNOSIS/MDM:   Vitals:    Vitals:    01/05/22 1131 01/05/22 1133   BP:  (!) 158/101   Pulse: 83    Resp: 18    Temp: 98.7 °F (37.1 °C)    TempSrc: Tympanic    SpO2: 97%            [facility]  Martha Krmaer is a 27 y.o. female with past medical history significant for morbid obesity, hypertension, endometrial cancer presents to the emergency department with complaints of chest tightness onset yesterday. She reports that the chest tightness is in her left upper chest.  She notes no measured fever. No headache or neck pain. Denies sore throat difficulty swallowing. Reports no cough or difficulty breathing. No abdominal pain, vomiting, diarrhea or dysuria. No known sick contacts. Exam unremarkable. Chest x-ray obtained, reviewed on self read by radiology with findings of atypical pneumonia. Urinalysis unremarkable. Urine pregnancy test negative. COVID-19 testing negative. BMP unremarkable. CBC unremarkable.      Coagulation studies normal.    CTA of the chest obtained, reviewed by myself and read by radiology without findings of pulmonary embolism. There is mild patchy groundglass attenuation within the left upper and left lower lobes. This finding was nonspecific but could represent an atypical pneumonia as well as an acute hypersensitivity pneumonitis. Patient discharged home with Zithromax and Keflex use as directed. She is advised to have close follow-up with PCP for reevaluation next week. Return for increased pain, fever, difficulty breathing, vomiting or any new or worsening signs or symptoms. REASSESSMENT          CRITICAL CARE TIME       CONSULTS:  None    PROCEDURES:  Unless otherwise noted below, none     Procedures      FINAL IMPRESSION      1. Pneumonia of left lung due to infectious organism, unspecified part of lung Stable         DISPOSITION/PLAN   DISPOSITION Decision To Discharge 01/05/2022 02:09:53 PM      PATIENT REFERRED TO:  Mariajose Rogers  [facility] [address] [place], [place] [id number]  [phone]    In 2 days  for re-evaluation      DISCHARGE MEDICATIONS:  New Prescriptions    AZITHROMYCIN (ZITHROMAX) 250 MG TABLET    Take 1 tablet by mouth See Admin Instructions for 5 days 500mg on day 1 followed by 250mg on days 2 - 5    CEPHALEXIN (KEFLEX) 500 MG CAPSULE    Take 1 capsule by mouth 3 times daily for 10 days     Controlled Substances Monitoring:     No flowsheet data found.     (Please note that portions of this note were completed with a voice recognition program.  Efforts were made to edit the dictations but occasionally words are mis-transcribed.)    ERLINDA Rashid CNP (electronically signed)  Attending Emergency Physician           ERLINDA Rashid CNP  01/05/22 1767

## 2022-03-14 ENCOUNTER — TELEPHONE (OUTPATIENT)
Dept: GYNECOLOGIC ONCOLOGY | Age: 32
End: 2022-03-14

## 2022-03-16 ENCOUNTER — TELEPHONE (OUTPATIENT)
Dept: GYNECOLOGIC ONCOLOGY | Age: 32
End: 2022-03-16

## 2022-03-16 NOTE — TELEPHONE ENCOUNTER
Pt just had surgery last week. She is requesting to reschedule appt until the end of April. Appt scheduled April 29 per her request.She will have US completed prior to appt. She denies any concerns at this time. She does state that she has a period each month for the last 3 months. Nati BANUELOS notified.

## 2022-04-22 ENCOUNTER — TELEPHONE (OUTPATIENT)
Dept: GYNECOLOGIC ONCOLOGY | Age: 32
End: 2022-04-22

## 2022-04-28 ENCOUNTER — TELEPHONE (OUTPATIENT)
Dept: GYNECOLOGIC ONCOLOGY | Age: 32
End: 2022-04-28

## 2022-04-28 NOTE — TELEPHONE ENCOUNTER
Pt stated that she needs to r/s 4.29.22 appt due to switching INS. Pt requested to r/s in June, due to new INS not effective until the end of May. Writer asked if 6.9.22 appt will give her enough time to complete US with new INS, pt stated she should be able to complete before appt.

## 2022-04-28 NOTE — TELEPHONE ENCOUNTER
Called and left patient a voicemail to review her cancel/changed appointment. I left detailed voicemail inquiring to see if she is still willing to proceed with a endometrial biopsy in lieu of a pelvic ultrasound at this time as her biopsy is overdue at this point. Advised patient return call to the office to discuss if she is willing to schedule an endometrial biopsy at next available appointment.

## 2022-06-06 ENCOUNTER — HOSPITAL ENCOUNTER (OUTPATIENT)
Dept: ULTRASOUND IMAGING | Age: 32
Discharge: HOME | End: 2022-06-08
Payer: COMMERCIAL

## 2022-06-06 DIAGNOSIS — Z97.5 IUD (INTRAUTERINE DEVICE) IN PLACE: ICD-10-CM

## 2022-06-06 DIAGNOSIS — C54.1 ENDOMETRIAL CANCER (HCC): ICD-10-CM

## 2022-06-06 PROCEDURE — 76830 TRANSVAGINAL US NON-OB: CPT

## 2022-06-09 ENCOUNTER — HOSPITAL ENCOUNTER (OUTPATIENT)
Age: 32
Setting detail: SPECIMEN
Discharge: HOME | End: 2022-06-09

## 2022-06-09 ENCOUNTER — PROCEDURE VISIT (OUTPATIENT)
Dept: GYNECOLOGIC ONCOLOGY | Age: 32
End: 2022-06-09
Payer: COMMERCIAL

## 2022-06-09 VITALS
DIASTOLIC BLOOD PRESSURE: 97 MMHG | WEIGHT: 263 LBS | BODY MASS INDEX: 44.9 KG/M2 | HEIGHT: 64 IN | OXYGEN SATURATION: 97 % | SYSTOLIC BLOOD PRESSURE: 142 MMHG | HEART RATE: 70 BPM

## 2022-06-09 DIAGNOSIS — C54.1 ENDOMETRIAL CANCER (HCC): Primary | ICD-10-CM

## 2022-06-09 LAB
CONTROL: NORMAL
PREGNANCY TEST URINE, POC: NEGATIVE

## 2022-06-09 PROCEDURE — 81025 URINE PREGNANCY TEST: CPT | Performed by: PHYSICIAN ASSISTANT

## 2022-06-09 PROCEDURE — G8427 DOCREV CUR MEDS BY ELIG CLIN: HCPCS | Performed by: PHYSICIAN ASSISTANT

## 2022-06-09 PROCEDURE — 99214 OFFICE O/P EST MOD 30 MIN: CPT | Performed by: PHYSICIAN ASSISTANT

## 2022-06-09 PROCEDURE — 1036F TOBACCO NON-USER: CPT | Performed by: PHYSICIAN ASSISTANT

## 2022-06-09 PROCEDURE — 58100 BIOPSY OF UTERUS LINING: CPT | Performed by: PHYSICIAN ASSISTANT

## 2022-06-09 PROCEDURE — G8417 CALC BMI ABV UP PARAM F/U: HCPCS | Performed by: PHYSICIAN ASSISTANT

## 2022-06-09 RX ORDER — OMEPRAZOLE 20 MG/1
CAPSULE, DELAYED RELEASE ORAL
COMMUNITY
Start: 2022-04-09 | End: 2022-11-03

## 2022-06-09 ASSESSMENT — ENCOUNTER SYMPTOMS
EYES NEGATIVE: 1
RESPIRATORY NEGATIVE: 1
CONSTIPATION: 1

## 2022-06-09 NOTE — PROGRESS NOTES
[facility], [facility], [address] Adela Watts is a 34 y.o. female who presents for her endometrial cancer surveillance visit    Chief Complaint:  Endometrial cancer    HPI: She is a kristin  female who was initially following with her local OB/GYN for long standing history of amenorrhea. She was then found to have complex endometrial hyperplasia with atypia on D&C in 2019. She had a long standing history of abnormal menstruation, and amenorrhea. She was advised to have a pelvic ultrasound which was obtained on 2020 and revealed a thickened endometrial stripe at 1.6 cm. It was discussed in detail at this time that the patient was desiring future fertility. She wanted to approach her symptoms and diagnoses with fertility-sparing measures. It was also advised that she begin the work up and process for infertility and weight management to aid in her desires. Therefore recommendations for fertility sparing treatment include hormonal therapy. Pt was advised to have a pelvic MRI and plan for a repeat D&C and progesterone IUD.  was within normal limits at 6 units on 2020. Pelvic MRI on 2020 for further evaluation of the uterus measuring 3.6 x 4.7 x 7.1 cm, the endometrial measures 18 mm in AP thickness with cystic changes noted. No evidence of invasive disease. She was then taken to the OR on 2020 for a D&C and Mirena IUD placement. Final pathology revealed a well-differentiated endometrioid carcinoma, FIGO grade 1 in the background of atypical complex hyperplasia. She was advised to have serial transvaginal ultrasound and endometrial biopsies every 3 months for 3 consecutive negative biopsy results. She underwent a pelvic ultrasound was performed on 2020 which has revealed a decrease in her endometrial stripe, now measuring 8.9 mm.  Uterus measures 7.6 x 4.3 x 4.7 cm, with normal myometrial echo texture. IUD was noted to be the appropriate position. No adnexal pathology. Follow up endometrial biopsy performed on 12/18/2020 revealed endometrial incision with hormone effect, negative for residual atypical glandular proliferation or malignancy. A pelvic ultrasound performed on 3/22/2021 revealed the uterus measuring 6.7 x 4.8 x 3.4 cm. Her endometrial stripe is now measuring 1.1 cm, within normal limits for premenopausal female. The position of the IUD in the cavity. Bilateral ovaries within normal limits. There is no evidence of free fluid. Endometrial biopsy obtained on 3/25/21 was negative for atypical hyperplasia or malignancy. Follow up pelvic ultrasound on 7/9/21 revealed uterus measuring 7.1 x 5.3 x 4.0 cm, endometrial stripe 1.7 cm, upper limits of normal. Bilateral ovaries within normal limits. Repeat endometrial biopsy on 7/15/21 revealed small foci of residual complex atypical gland most compatible with residual endometrioid adenocarcinoma. She was counsled to continue progesterone treatment as patient continues to desire fertility and has been following with the [facility] bariatric program for surgery. Pelvic ultrasound on 10/12/2021 measured 7.2 x 3.5 x 5.3 cm the endometrial stripe is 7.2 mm. IUD noted. Bilateral ovaries within normal limits. Endometrial biopsy on October 20, 2021 was negative for atypia or neoplasm    In the interim since we saw her last she did undergo a Ministerio-en-Y gastric bypass on 3/9/2022 by Dr. Sonal Wilson. Most recent pelvic ultrasound on 6/6/2022 revealed endometrial stripe 10 mm. arabella Slightly complexity noted near the cervix. Right ovary contains a 1.7 x 1.6 x 1.9 cm hypoechoic area without shadowing. The right ovary is within normal limits. There is no free fluid. Today, she is doing remarkably well.   She has lost close to 80 pounds since her gastric bypass exactly 3 months ago today.  She states that she has no abdominal or pelvic pain. She has close follow-up with her bariatric surgeons in the coming weeks. Since her weight loss, she is having menstrual cycles monthly. She believes her last menstrual cycle was 5/23/2022. She states the length of her menses is approximately 7 to 10 days. She states the flow is moderate. She denies dysmenorrhea. She has been placed on minocycline by her dermatologist for hidradenitis with improvement of her symptoms. Review of Systems  I have personally reviewed and agree with the review of the systems done by my ancillary staff in the [facility] documentation. Objective:  Vitals:    06/09/22 1521 06/09/22 1528   BP: (!) 163/100 (!) 142/97   Site: Left Lower Arm    Position: Sitting    Cuff Size: Medium Adult    Pulse: 70    SpO2: 97%    Weight: 263 lb (119.3 kg)    Height: 5' 4\" (1.626 m)        Physical Exam   General: well appearing, alert and oriented x 3, no acute distress. HEENT: No cervical or supraclavicular lymphadenopathy. No thyromegaly. Abdomen:  Soft. Multiple laparoscopic incision scars present from recent surgery. No palpable masses or ascites. Non tender to deep palpation throughout abdominal cavity. No detectable organomegaly. No inguinal lymphadenopathy bilaterally. Extremities: No edema, deformities, or calf tenderness bilaterally    Pelvic Exam using a large speculum reveals no blood or discharge in the vaginal canal. The cervix is visualized and IUD strings are present. No odor or foul discharge. The cervix was then cleansed with betadine and a single tooth tenaculum was used on the anterior cervix. The endometrial Pipelle was then advanced and the endometrium was sounded to 8 cm. Pass was made with Pipelle and tissue was obtained. The tenaculum was removed and direct pressure was applied. Adequate hemostasis was achieved. The patient tolerated without incident.      Bimanual examination reveals a smooth vaginal canal, no palpable nodule or firmness noted. The IUD strings are palpable and the cervix is noted to be without firmness or nodularity. Limited evaluation of uterus secondary to patient habitus however no palpable masses or enlarged pathology. No palpable adnexal pathology. Lab Results   Component Value Date     6 08/07/2020       Assessment:  Cancer Staging  Endometrial cancer [facility])  Staging form: Corpus Uteri - Carcinoma And Carcinosarcoma, AJCC 8th Edition  - Clinical stage from 9/17/2020: FIGO Stage I (cT1, cN0, cM0) - Signed by Karolina Palmer MD on 9/17/2020  Staging comments: The patient is a young infertility patient and did not wish to have hysterectomy. She was therefore staged only clinically. An MRI of the pelvis was performed. 1. Endometrial cancer ([address]        Plan:  1. Will await endometrial biopsy taken today and notify patient of results accordingly    2. She is being referred to genetic counseling for diagnosis of endometrial cancer under age 48. She will await a call to schedule this appointment. Discussion:  Reviewed care plan with attending Dr. Richie Mason. Also reviewed patient's bariatric surgeon recommend 18 months after surgery before achieving fertility. Confirmed with patient that she wishes to continue desire for future fertility. Discussed management plan if there are any atypical findings or underlying residual malignancy would want the patient to perform an exam under anesthesia with D&C versus addition of oral progesterone therapy and repeat endometrial sampling. If her biopsy is negative then we will likely discuss moving out her biopsies to every 6 months. We do also want to keep an eye on her menses if they progress in length or severity this may also warrant a dilation and curettage.      At this point in time she will be scheduled to follow up in 3 months with repeat pelvic ultrasound this will also re-evaluate the ovarian cyst. Patient is agreeable to care plan.    Electronically signed by Bert Torre PA-C on 6/9/2022 at 3:00 PM EDT

## 2022-06-09 NOTE — PROGRESS NOTES
Review of Systems   Constitutional: Positive for appetite change (bariatric surgery). HENT:  Negative. Eyes: Negative. Respiratory: Negative. Cardiovascular: Negative. Gastrointestinal: Positive for constipation. Endocrine: Negative. Genitourinary: Negative. Musculoskeletal: Negative. Skin: Negative. Neurological: Negative. Hematological: Bruises/bleeds easily.

## 2022-06-13 LAB — SURGICAL PATHOLOGY REPORT: NORMAL

## 2022-06-14 NOTE — RESULT ENCOUNTER NOTE
Pt was rescheduled from 6/17/22 at 8:00am to 6/16/22 at 3:15pm. I just noticed that she wanted to see her on the 17th at 3:30pm, but I didn't see a block for that time slot.

## 2022-06-14 NOTE — RESULT ENCOUNTER NOTE
Pt called back with understanding, and scheduled an appt on 6/17/22 with Dr. Margarito Saini at 8:00am.

## 2022-06-16 ENCOUNTER — TELEMEDICINE (OUTPATIENT)
Dept: GYNECOLOGIC ONCOLOGY | Age: 32
End: 2022-06-16
Payer: COMMERCIAL

## 2022-06-16 DIAGNOSIS — C54.1 ENDOMETRIAL CANCER (HCC): Primary | ICD-10-CM

## 2022-06-16 DIAGNOSIS — Z97.5 IUD (INTRAUTERINE DEVICE) IN PLACE: ICD-10-CM

## 2022-06-16 DIAGNOSIS — Z01.818 PRE-OPERATIVE EXAM: ICD-10-CM

## 2022-06-16 PROCEDURE — G8417 CALC BMI ABV UP PARAM F/U: HCPCS | Performed by: OBSTETRICS & GYNECOLOGY

## 2022-06-16 PROCEDURE — G8427 DOCREV CUR MEDS BY ELIG CLIN: HCPCS | Performed by: OBSTETRICS & GYNECOLOGY

## 2022-06-16 PROCEDURE — 99215 OFFICE O/P EST HI 40 MIN: CPT | Performed by: OBSTETRICS & GYNECOLOGY

## 2022-06-16 PROCEDURE — 1036F TOBACCO NON-USER: CPT | Performed by: OBSTETRICS & GYNECOLOGY

## 2022-06-16 RX ORDER — SODIUM CHLORIDE 0.9 % (FLUSH) 0.9 %
5-40 SYRINGE (ML) INJECTION PRN
Status: CANCELLED | OUTPATIENT
Start: 2022-06-16

## 2022-06-16 RX ORDER — SODIUM CHLORIDE 0.9 % (FLUSH) 0.9 %
5-40 SYRINGE (ML) INJECTION EVERY 12 HOURS SCHEDULED
Status: CANCELLED | OUTPATIENT
Start: 2022-06-16

## 2022-06-16 RX ORDER — ENOXAPARIN SODIUM 100 MG/ML
40 INJECTION SUBCUTANEOUS ONCE
Status: CANCELLED | OUTPATIENT
Start: 2022-06-16 | End: 2022-06-16

## 2022-06-16 RX ORDER — SODIUM CHLORIDE 9 MG/ML
INJECTION, SOLUTION INTRAVENOUS PRN
Status: CANCELLED | OUTPATIENT
Start: 2022-06-16

## 2022-06-16 ASSESSMENT — ENCOUNTER SYMPTOMS
RESPIRATORY NEGATIVE: 1
GASTROINTESTINAL NEGATIVE: 1

## 2022-06-20 ENCOUNTER — HOSPITAL ENCOUNTER (OUTPATIENT)
Age: 32
Setting detail: OUTPATIENT SURGERY
Discharge: HOME | End: 2022-06-20
Attending: OBSTETRICS & GYNECOLOGY | Admitting: OBSTETRICS & GYNECOLOGY
Payer: COMMERCIAL

## 2022-06-20 ENCOUNTER — ANESTHESIA (OUTPATIENT)
Dept: OPERATING ROOM | Age: 32
End: 2022-06-20
Payer: COMMERCIAL

## 2022-06-20 ENCOUNTER — ANESTHESIA EVENT (OUTPATIENT)
Dept: OPERATING ROOM | Age: 32
End: 2022-06-20
Payer: COMMERCIAL

## 2022-06-20 VITALS
BODY MASS INDEX: 43.87 KG/M2 | DIASTOLIC BLOOD PRESSURE: 96 MMHG | SYSTOLIC BLOOD PRESSURE: 148 MMHG | HEIGHT: 64 IN | OXYGEN SATURATION: 99 % | HEART RATE: 78 BPM | WEIGHT: 257 LBS | RESPIRATION RATE: 17 BRPM | TEMPERATURE: 97 F

## 2022-06-20 DIAGNOSIS — C54.1 ENDOMETRIAL CANCER (HCC): ICD-10-CM

## 2022-06-20 PROBLEM — Z98.890 HISTORY OF HYSTEROSCOPY: Status: ACTIVE | Noted: 2022-06-20

## 2022-06-20 LAB — GLUCOSE BLD-MCNC: 87 MG/DL (ref 65–105)

## 2022-06-20 PROCEDURE — 3700000000 HC ANESTHESIA ATTENDED CARE: Performed by: OBSTETRICS & GYNECOLOGY

## 2022-06-20 PROCEDURE — 58300 INSERT INTRAUTERINE DEVICE: CPT | Performed by: OBSTETRICS & GYNECOLOGY

## 2022-06-20 PROCEDURE — 88305 TISSUE EXAM BY PATHOLOGIST: CPT

## 2022-06-20 PROCEDURE — 6360000002 HC RX W HCPCS: Performed by: ANESTHESIOLOGY

## 2022-06-20 PROCEDURE — 99359 PROLONG SERV W/O CONTACT ADD: CPT | Performed by: OBSTETRICS & GYNECOLOGY

## 2022-06-20 PROCEDURE — 2580000003 HC RX 258: Performed by: OBSTETRICS & GYNECOLOGY

## 2022-06-20 PROCEDURE — 88300 SURGICAL PATH GROSS: CPT

## 2022-06-20 PROCEDURE — 7100000000 HC PACU RECOVERY - FIRST 15 MIN: Performed by: OBSTETRICS & GYNECOLOGY

## 2022-06-20 PROCEDURE — 99358 PROLONG SERVICE W/O CONTACT: CPT | Performed by: OBSTETRICS & GYNECOLOGY

## 2022-06-20 PROCEDURE — 6360000002 HC RX W HCPCS: Performed by: OBSTETRICS & GYNECOLOGY

## 2022-06-20 PROCEDURE — 7100000001 HC PACU RECOVERY - ADDTL 15 MIN: Performed by: OBSTETRICS & GYNECOLOGY

## 2022-06-20 PROCEDURE — 82947 ASSAY GLUCOSE BLOOD QUANT: CPT

## 2022-06-20 PROCEDURE — 6360000002 HC RX W HCPCS: Performed by: NURSE ANESTHETIST, CERTIFIED REGISTERED

## 2022-06-20 PROCEDURE — 7100000011 HC PHASE II RECOVERY - ADDTL 15 MIN: Performed by: OBSTETRICS & GYNECOLOGY

## 2022-06-20 PROCEDURE — 6370000000 HC RX 637 (ALT 250 FOR IP): Performed by: ANESTHESIOLOGY

## 2022-06-20 PROCEDURE — 2720000010 HC SURG SUPPLY STERILE: Performed by: OBSTETRICS & GYNECOLOGY

## 2022-06-20 PROCEDURE — 3600000004 HC SURGERY LEVEL 4 BASE: Performed by: OBSTETRICS & GYNECOLOGY

## 2022-06-20 PROCEDURE — 3600000014 HC SURGERY LEVEL 4 ADDTL 15MIN: Performed by: OBSTETRICS & GYNECOLOGY

## 2022-06-20 PROCEDURE — 2500000003 HC RX 250 WO HCPCS: Performed by: NURSE ANESTHETIST, CERTIFIED REGISTERED

## 2022-06-20 PROCEDURE — 2580000003 HC RX 258: Performed by: NURSE ANESTHETIST, CERTIFIED REGISTERED

## 2022-06-20 PROCEDURE — 58558 HYSTEROSCOPY BIOPSY: CPT | Performed by: OBSTETRICS & GYNECOLOGY

## 2022-06-20 PROCEDURE — 7100000010 HC PHASE II RECOVERY - FIRST 15 MIN: Performed by: OBSTETRICS & GYNECOLOGY

## 2022-06-20 PROCEDURE — 3700000001 HC ADD 15 MINUTES (ANESTHESIA): Performed by: OBSTETRICS & GYNECOLOGY

## 2022-06-20 PROCEDURE — 2709999900 HC NON-CHARGEABLE SUPPLY: Performed by: OBSTETRICS & GYNECOLOGY

## 2022-06-20 RX ORDER — PROPOFOL 10 MG/ML
INJECTION, EMULSION INTRAVENOUS PRN
Status: DISCONTINUED | OUTPATIENT
Start: 2022-06-20 | End: 2022-06-20 | Stop reason: SDUPTHER

## 2022-06-20 RX ORDER — ENOXAPARIN SODIUM 100 MG/ML
40 INJECTION SUBCUTANEOUS ONCE
Status: COMPLETED | OUTPATIENT
Start: 2022-06-20 | End: 2022-06-20

## 2022-06-20 RX ORDER — ONDANSETRON 2 MG/ML
INJECTION INTRAMUSCULAR; INTRAVENOUS PRN
Status: DISCONTINUED | OUTPATIENT
Start: 2022-06-20 | End: 2022-06-20 | Stop reason: SDUPTHER

## 2022-06-20 RX ORDER — FENTANYL CITRATE 50 UG/ML
INJECTION, SOLUTION INTRAMUSCULAR; INTRAVENOUS PRN
Status: DISCONTINUED | OUTPATIENT
Start: 2022-06-20 | End: 2022-06-20 | Stop reason: SDUPTHER

## 2022-06-20 RX ORDER — KETOROLAC TROMETHAMINE 30 MG/ML
INJECTION, SOLUTION INTRAMUSCULAR; INTRAVENOUS PRN
Status: DISCONTINUED | OUTPATIENT
Start: 2022-06-20 | End: 2022-06-20 | Stop reason: SDUPTHER

## 2022-06-20 RX ORDER — SODIUM CHLORIDE 0.9 % (FLUSH) 0.9 %
5-40 SYRINGE (ML) INJECTION EVERY 12 HOURS SCHEDULED
Status: DISCONTINUED | OUTPATIENT
Start: 2022-06-20 | End: 2022-06-20 | Stop reason: HOSPADM

## 2022-06-20 RX ORDER — SODIUM CHLORIDE 0.9 % (FLUSH) 0.9 %
5-40 SYRINGE (ML) INJECTION PRN
Status: DISCONTINUED | OUTPATIENT
Start: 2022-06-20 | End: 2022-06-20 | Stop reason: HOSPADM

## 2022-06-20 RX ORDER — ACETAMINOPHEN 500 MG
1000 TABLET ORAL EVERY 6 HOURS PRN
Qty: 30 TABLET | Refills: 0 | Status: SHIPPED | OUTPATIENT
Start: 2022-06-20

## 2022-06-20 RX ORDER — ACETAMINOPHEN 325 MG/1
650 TABLET ORAL ONCE
Status: COMPLETED | OUTPATIENT
Start: 2022-06-20 | End: 2022-06-20

## 2022-06-20 RX ORDER — FENTANYL CITRATE 50 UG/ML
25 INJECTION, SOLUTION INTRAMUSCULAR; INTRAVENOUS EVERY 5 MIN PRN
Status: DISCONTINUED | OUTPATIENT
Start: 2022-06-20 | End: 2022-06-20 | Stop reason: HOSPADM

## 2022-06-20 RX ORDER — ROCURONIUM BROMIDE 10 MG/ML
INJECTION, SOLUTION INTRAVENOUS PRN
Status: DISCONTINUED | OUTPATIENT
Start: 2022-06-20 | End: 2022-06-20 | Stop reason: SDUPTHER

## 2022-06-20 RX ORDER — ONDANSETRON 2 MG/ML
4 INJECTION INTRAMUSCULAR; INTRAVENOUS
Status: COMPLETED | OUTPATIENT
Start: 2022-06-20 | End: 2022-06-20

## 2022-06-20 RX ORDER — DOCUSATE SODIUM 100 MG/1
100 CAPSULE, LIQUID FILLED ORAL 2 TIMES DAILY
Qty: 60 CAPSULE | Refills: 0 | Status: SHIPPED | OUTPATIENT
Start: 2022-06-20 | End: 2022-07-20

## 2022-06-20 RX ORDER — FENTANYL CITRATE 50 UG/ML
50 INJECTION, SOLUTION INTRAMUSCULAR; INTRAVENOUS EVERY 5 MIN PRN
Status: DISCONTINUED | OUTPATIENT
Start: 2022-06-20 | End: 2022-06-20 | Stop reason: HOSPADM

## 2022-06-20 RX ORDER — SODIUM CHLORIDE 9 MG/ML
INJECTION, SOLUTION INTRAVENOUS PRN
Status: DISCONTINUED | OUTPATIENT
Start: 2022-06-20 | End: 2022-06-20 | Stop reason: HOSPADM

## 2022-06-20 RX ORDER — DEXAMETHASONE SODIUM PHOSPHATE 10 MG/ML
INJECTION INTRAMUSCULAR; INTRAVENOUS PRN
Status: DISCONTINUED | OUTPATIENT
Start: 2022-06-20 | End: 2022-06-20 | Stop reason: SDUPTHER

## 2022-06-20 RX ORDER — LIDOCAINE HYDROCHLORIDE 10 MG/ML
INJECTION, SOLUTION EPIDURAL; INFILTRATION; INTRACAUDAL; PERINEURAL PRN
Status: DISCONTINUED | OUTPATIENT
Start: 2022-06-20 | End: 2022-06-20 | Stop reason: SDUPTHER

## 2022-06-20 RX ORDER — SODIUM CHLORIDE, SODIUM LACTATE, POTASSIUM CHLORIDE, CALCIUM CHLORIDE 600; 310; 30; 20 MG/100ML; MG/100ML; MG/100ML; MG/100ML
INJECTION, SOLUTION INTRAVENOUS CONTINUOUS PRN
Status: DISCONTINUED | OUTPATIENT
Start: 2022-06-20 | End: 2022-06-20 | Stop reason: SDUPTHER

## 2022-06-20 RX ORDER — MIDAZOLAM HYDROCHLORIDE 1 MG/ML
INJECTION INTRAMUSCULAR; INTRAVENOUS PRN
Status: DISCONTINUED | OUTPATIENT
Start: 2022-06-20 | End: 2022-06-20 | Stop reason: SDUPTHER

## 2022-06-20 RX ORDER — MAGNESIUM HYDROXIDE 1200 MG/15ML
LIQUID ORAL CONTINUOUS PRN
Status: DISCONTINUED | OUTPATIENT
Start: 2022-06-20 | End: 2022-06-20 | Stop reason: HOSPADM

## 2022-06-20 RX ADMIN — MIDAZOLAM HYDROCHLORIDE 2 MG: 2 INJECTION, SOLUTION INTRAMUSCULAR; INTRAVENOUS at 15:31

## 2022-06-20 RX ADMIN — PROPOFOL 150 MG: 10 INJECTION, EMULSION INTRAVENOUS at 15:33

## 2022-06-20 RX ADMIN — ONDANSETRON 4 MG: 2 INJECTION INTRAMUSCULAR; INTRAVENOUS at 15:44

## 2022-06-20 RX ADMIN — SODIUM CHLORIDE, POTASSIUM CHLORIDE, SODIUM LACTATE AND CALCIUM CHLORIDE: 600; 310; 30; 20 INJECTION, SOLUTION INTRAVENOUS at 15:22

## 2022-06-20 RX ADMIN — KETOROLAC TROMETHAMINE 30 MG: 30 INJECTION, SOLUTION INTRAMUSCULAR at 16:05

## 2022-06-20 RX ADMIN — ONDANSETRON 4 MG: 2 INJECTION INTRAMUSCULAR; INTRAVENOUS at 16:50

## 2022-06-20 RX ADMIN — SODIUM CHLORIDE, POTASSIUM CHLORIDE, SODIUM LACTATE AND CALCIUM CHLORIDE: 600; 310; 30; 20 INJECTION, SOLUTION INTRAVENOUS at 16:12

## 2022-06-20 RX ADMIN — DEXAMETHASONE SODIUM PHOSPHATE 10 MG: 10 INJECTION INTRAMUSCULAR; INTRAVENOUS at 15:44

## 2022-06-20 RX ADMIN — FENTANYL CITRATE 50 MCG: 50 INJECTION, SOLUTION INTRAMUSCULAR; INTRAVENOUS at 15:53

## 2022-06-20 RX ADMIN — ENOXAPARIN SODIUM 40 MG: 100 INJECTION SUBCUTANEOUS at 11:43

## 2022-06-20 RX ADMIN — ROCURONIUM BROMIDE 40 MG: 10 INJECTION INTRAVENOUS at 15:33

## 2022-06-20 RX ADMIN — LIDOCAINE HYDROCHLORIDE 50 MG: 10 INJECTION, SOLUTION EPIDURAL; INFILTRATION; INTRACAUDAL; PERINEURAL at 15:33

## 2022-06-20 RX ADMIN — SUGAMMADEX 200 MG: 100 INJECTION, SOLUTION INTRAVENOUS at 16:14

## 2022-06-20 RX ADMIN — ACETAMINOPHEN 650 MG: 325 TABLET ORAL at 17:08

## 2022-06-20 RX ADMIN — FENTANYL CITRATE 50 MCG: 50 INJECTION, SOLUTION INTRAMUSCULAR; INTRAVENOUS at 15:33

## 2022-06-20 ASSESSMENT — LIFESTYLE VARIABLES: SMOKING_STATUS: 0

## 2022-06-20 ASSESSMENT — PAIN DESCRIPTION - DESCRIPTORS: DESCRIPTORS: CRAMPING

## 2022-06-20 ASSESSMENT — PAIN DESCRIPTION - LOCATION: LOCATION: ABDOMEN

## 2022-06-20 ASSESSMENT — PAIN SCALES - GENERAL: PAINLEVEL_OUTOF10: 5

## 2022-06-20 NOTE — H&P
0.9 % sodium chloride infusion   IntraVENous PRN Vitaly Lyle MD        sodium chloride flush 0.9 % injection 5-40 mL  5-40 mL IntraVENous 2 times per day Vitaly Lyle MD        sodium chloride flush 0.9 % injection 5-40 mL  5-40 mL IntraVENous PRN Shanda Lyle MD        levonorgestrel (MIRENA) IUD 52 mg 1 each  1 each IntraUTERine Once Vitaly Lyle MD           FAMILY HISTORY:  family history includes Cancer in her father, mother, paternal grandfather, and paternal grandmother; Diabetes in her maternal grandmother; Heart Disease in her maternal grandmother; Other in her father. SOCIAL HISTORY:   reports that she has never smoked. She has never used smokeless tobacco. She reports previous alcohol use. She reports that she does not use drugs.     VITALS:  Vitals:    06/17/22 0958 06/20/22 1115   BP:  (!) 138/100   Pulse:  67   Resp:  18   Temp:  97.7 °F (36.5 °C)   TempSrc:  Temporal   SpO2:  98%   Weight: 257 lb (116.6 kg)    Height: 5' 4\" (1.626 m)                                                                                                                                PHYSICAL EXAM:     Unchanged from Prior H&P  CONSTITUTIONAL:  Alert and oriented, no acute distress  HEAD: normocephalic, atraumatic  EYES: Pupils equal and reactive to light, Extraocular muscles intact, sclera non icteric  ENT: Mucus membranes moist, No otorrhea, no rhinorrhea  NECK:  supple, symmetrical, trachea midline   LUNGS:  Good air movement bilaterally, unlabored respirations, no wheezes or rhonchi  CARDIOVASCULAR: Regular rate and rhythm, no murmurs rubs or gallops  ABDOMEN: soft, non tender, non distended, no rebound or guarding, no hernias, no hepatomegaly, no splenomegly  MUSCULOSKELETAL:  Equal strength bilaterally, normal muscle tone  SKIN: No abscess or rash  NEUROLOGIC:  Cranial nerves 2-12 grossly intact, no focal deficits  PSYCH: affect appropriate  Pelvic Exam: deferred to OR      LAB RESULTS:  Admission on 06/20/2022   Component Date Value Ref Range Status    POC Glucose 06/20/2022 87  65 - 105 mg/dL Final   Hospital Outpatient Visit on 06/09/2022   Component Date Value Ref Range Status    Surgical Pathology Report 06/09/2022    Final                    Value:-- Diagnosis --  ENDOMETRIUM, BIOPSY:            -  WELL DIFFERENTIATED ENDOMETRIOID ADENOCARCINOMA (FIGO  GRADE 1) WITH SQUAMOUS METAPLASIA AND PROGESTERONE EFFECT. Lawrence Traore M.D.  **Electronically Signed Out**         jet/6/13/2022       Clinical Information  Pre-op Diagnosis:  ENDOMETRIAL CANCER, CURRENT PROGESTERONE THERAPY    Operative Findings:  ENDOMETRIAL BX    Source of Specimen  A: ENDOMETRIAL BX    Gross Description  \"MARIN KATLYN, ENDO BIOPSY\" Tan to dark brown fragments, 2.4 x 1.1 x  0.2 cm in aggregate. Entirely 1cs. jg tm      Microscopic Description  Sections of endometrial glands and stroma show prominent progesterone  effect. Additionally, there are areas of residual cribriform glands  with fusion and intervening areas of squamous metaplasia. Slides were  reviewed with a second pathologist (YULIANA) who agrees with the  diagnosis. SURGICAL PATHOLOGY CONSULTATION       Patient Name: Edison Pryor: [id number]  Path Number: [id number]    [place] [address]. [facility], [address]  [phone]  Fax: [phone]     Procedure visit on 06/09/2022   Component Date Value Ref Range Status    Preg Test, Ur 06/09/2022 negative   Final       DIAGNOSTICS:  US NON OB TRANSVAGINAL    Result Date: 6/7/2022  EXAMINATION: PELVIC ULTRASOUND 6/6/2022 TECHNIQUE: Transvaginal pelvic ultrasound duplex ultrasound using B-mode/gray scaled imaging, Doppler spectral analysis and color flow Doppler was obtained.  COMPARISON: 12 October 2021 HISTORY: ORDERING SYSTEM PROVIDED HISTORY: Endometrial cancer [facility] TECHNOLOGIST PROVIDED HISTORY: This procedure can be scheduled via [employer]. Access your [employer] account by visiting [employer]. Endometrial cancer FINDINGS: Measurements: Uterus: 6.5 x 4.8 x 3.6 cm. Endometrial stripe: 10 mm. Right Ovary:4.5 x 2.8 x 2.2 cm. Left Ovary: 3.1 x 2.3 x 1.9 cm. Ultrasound Findings: Uterus: Uterus is anteverted and anteflexed. Slightly complex area is noted near the cervix possibly related to the patient's neoplasia. The myometrium is unremarkable in appearance. Endometrial stripe: Endometrial stripe contains echogenicity, linear consistent with intrauterine device. Right Ovary: Right ovary contains a hyperechoic area without shadowing. This measures 1.7 x 1.6 x 1.9 cm. Follicles are noted. Normal arterial and venous flow is noted. Left Ovary:  Left ovary is within normal limits. There is normal arterial and venous Doppler flow. Free Fluid: No evidence of free fluid. 1.  Heterogenous area in the lower uterine segment near the cervix possibly related to the patient's known endometrial cancer. 2.  Hyperechoic area in the right ovary, relatively uniform. Findings are indeterminate. Possibilities include endometrioma. Dermoid unlikely due to relatively uniform echogenicity. Hemorrhagic cyst less likely. RECOMMENDATIONS: Follow-up ultrasound in 6-12 weeks to further assess right ovarian hyperechoic area. DIAGNOSIS & PLAN:  1. Endometrial Cancer   - Proceed with planned procedure: EUA, Hysteroscopy, dilation and curettage, placement of Mirena IUD   - Consent signed, on chart. - The patient is ready for transport to the operative suite. Counseling: The patient was counseled on all options both medical and surgical, conservative as well as definitive. She has elected to proceed with the procedure as stated above. The patient was counseled on the procedure. Risks and complications were reviewed in detail. The patients orders, labs, consents have been completed.  The history and physical as well as all supporting surgical documentation will be forwarded to the pre-operative holding area. The patient is aware that this procedure may not alleviate her symptoms. That there may be a necessity for a second surgery and that there may be an incomplete removal of abnormal tissue. Danna Barnhart DO  Gyn/Onc Resident   [employer] [address]  6/20/2022, 1:16 PM      Attending Physician Statement  I have discussed the care of Dagoberto Client, including pertinent history and exam findings, with the resident. I have seen and examined the patient and the key elements of all parts of the encounter have been performed by me. I agree with the assessment, plan and orders as documented by the resident.      Electronically signed by Alberto Munoz MD on 6/23/2022 at 8:05 PM    Gynecologic Oncology

## 2022-06-20 NOTE — PROGRESS NOTES
[facility], [facility], [address] [address]    GYNECOLOGIC ONCOLOGY   FOLLOW UP NOTE    PATIENT NAME:  Cayla Escudero  MRN:                     [id number]  DATE:                    06/16/22    REASON FOR VISIT:     Endometrial Cancer   Pre-Op Visit  Desires fertility preservation    TELEHEALTH CONSENT:      On 06/16/22, Cayla Escudero consented to a Telehealth visit with the [address] [place] [address] Oncology. Cayla Escudero was contacted to initiate a video encounter. Prior to continuing visit, Cayla Escudero was verified using two factors. All individuals in the room were identified and approved by the patient prior to the consult. Additionally, Cayla Escudero is reminded that all clinical decision making is being based upon the conversation and limited physical exam. It is discussed that further evaluation may be needed due to the limited nature of video visits. Patient confirms understanding and consents to telemedicine encounter. This video visit is conducted by the Dr. Keegan Fam from the medical office. There were no vitals taken for this visit. No technical issues were experienced. A level of care equivalent to in person care was achieved. I have obtained verbal consent for this Telehealth appointment from the patient prior to the encounter. The patient has initiated this Telehealth encounter. The services were provided in the context of a synchronous videoconference encounter. HISTORY OF PRESENT ILLNESS:     Cayla Escudero is a 29yo with LMP 05/23/22, who presents as a transfer of care from Dr. 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 She presents, unaccompanied, to the office today. The patient denies to have any specific concerns.      The patient reports to have attempting to achieve pregnancy, and had stopped the OCPs. She did not have regular menses and did not achieve a pregnancy. She was then restarted on Provera, and had difficulties achieving a menstruation. DIAGNOSTIC STUDIES:  IMAGING EVALUATION:  Office TVUS (09/12/19)  7.62x5.03x3.9cm homogeneous appearing uterus, with endometrium measuring 0.7cm, bright calcifications within the endometrium  2.59x2.21x2.3cm LEFT ovary  2.78x2.91x1.95cm RIGHT ovary  Both ovaries containing multiple subcentimeter cysts, again consistent with PCOS  No free fluid  LAB EVALUATION:  FSH, LH, Estradiol (02/29/16): Normal   FSH, Estradiol, Prolactin (0717/19): Normal  Prolactin (10/20/20): Normal    Based on these findings the patient was counseled to return for an endometrial biopsy     DIAGNOSIS ESTABLISHED:   Endometrial Biopsy (10/14/19), by Dr. Rachell Mcleod   8cm uterine sound   Final pathology Endometrial Intraepithelial Neoplasia, Endocervical polyp, with microglandular hyperplasia. Negative for high-grade dysplasia or malignancy  Dilation & Curettage, hysteroscopy (11/06/19), under the care of Dr. Rachell Mcleod  Intra-operative findings are consistent with axial uterus sounding to 9cm with endometrial polyps, one large, one smaller. The large polyp was attached to the posterior uterine fundus. Smaller polyp is at the 5 o'clock area at the junction between the cervix and the corpus of the uterus. Final pathology is consistent with Polypoid Endometrial Intraepithelial Neoplasia, and stromal squamous and osseous metaplasia. No definitive endometrial carcinoma identified in the specimen. At this time, the patient was continuing to desire fertility, therefore she was referred to a reproductive endocrinologist. However the patient was not seen by the fertility specialist secondary to insurance coverage.   Visit note, Dr. Rachell Mcleod (11/18/19)  Because of the findings of complex atypical hyperplasia and her desire to have childbearing I am going to refer her to reproductive endocrinology. The patient is going to check her insurance panel and notify the office so we can make appropriate referral    She then presented to her Teddy Montoya again on 07/15/20, with infertility and weight gain. The patient has not had a period since this procedure and is wanting pregnancy at some point. After reviewing case with Dr. Robert Aiken her is worried about these cells turing cancerous during this time and wants a consult at your practice [Gynecologic Oncology] for management and procedural plan     At this time, the patient was then referred to [facility] gynecologic oncology, given the history of Endometrial Intraepithelial Neoplasia of the endometrium for further work-up and treatment  DIAGNOSTIC STUDIES:  IMAGING EVALUATION:  Pelvic Ultrasound (08/07/20). Transabdominal and Transvaginal Views  7.6x4. 5x3.5cm uterus, with a 1.6cm thickened endometrial stripe. 3.6x1. 2x1.5cm RIGHT ovary. 3.3x1. 5x1.5cm LEFT ovary  No free fluid  Pelvic MRI (08/25/20)  COMPARISON: Pelvic ultrasound 08/07/20  3.6x4.7x7.1cm uterus, the endometrial measures 1.8cm with cystic changes noted. No evidence of invasive disease. 2.5x2. 4x3.1cm RIGHT ovary   2.1x2.4x3.1cm LEFT ovary  No evidence for pelvic lymphadenopathy or ascites  CXR (08/26/20 and 08/31/21): No acute cardiopulmonary findings  LAB EVALUATION:   = 6 (08/07/20)    Referred to JACKY Washington  Referred to Bariatric weight management department    FERTILITY SPARING SURGICAL MANAGEMENT: Dilation & Curettage, with Mirena IUD placement (09/08/20), by Dr. Jess Basilio  Intra-operative findings are consistent with pelvic examination demonstrated a small mobile symmetrical uterus. Adnexa could not be palpated because of her obesity. There was no nodularity noted in the cul-de-sac. No vaginal lesions were noted. Uterus was sounded to 7cm.   Final pathology demonstrated findings of a well-differentiated endometrioid carcinoma, with squamous differentiation, in the background of Endometrial Intraepithelial Neoplasia, polyp fragments and stromal breakdown. Serial TVUS and Endometrial biopsies every 3 months for 3 consecutive negative biopsy results    SURVEILLANCE:  IMAGING EVALUATION:  CXR (08/31/21): No acute cardiopulmonary findings  Pelvic Ultrasound (12/16/20). Transabdominal and Transvaginal Views  COMPARISON: 08/07/20  7.6x4. 3x4.7cm uterus, thickened endometrial stripe at 0.89cm. IUD was noted to be the appropriate position. Tiny nabothian cysts in the cervix  3.97x2.42x2.52cm RIGHT ovary  3.68x1.65x2.40cm LEFT ovary  No free fluid  Pelvic Ultrasound (03/22/21). Transabdominal and Transvaginal Views  6.7x4. 8x3.4cm uterus, 1.1cm endometrial stripe. The position of the IUD in the cavity  Bilateral ovaries within normal limits  There is no evidence of free fluid  Pelvic Ultrasound (07/09/21). Transabdominal and Transvaginal Views  7.1x5.3x4.0cm uterus, 1.7cm endometrial stripe. IUD is in place in satisfactory position   3.8x1. 8x2.2cm RIGHT ovary  3.7x2. 9x2.3cm LEFT ovary  No evidence of free fluid  PATHOLOGY EVALUATION:  Endometrial biopsy (12/18/20)  Endometrial glands and stroma with hormone effect, fragments of hyalinized endometrium with necrosis and inflammation. Negative for residual atypical glandular proliferation or malignancy  Endometrial biopsy (03/25/21)  Endometrium with prominent predecidual change, compatible with progestational agent effect. Negative for atypical hyperplasia or malignancy  Endometrial biopsy (07/15/21)  Small foci of residual complex atypical glands, most compatible with residual endometrioid adenocarcinoma  COMMENT: Endometrial biopsy has fragments of benign endometrium with prominent stromal predecidual change, compatible with progestational  agent effect.   In addition, few small foci of residual complex  atypical glands are present, which in the context of a patient with previous diagnosis of endometrioid adenocarcinoma, FIGO grade 1, treated with progesterone, most likely represent residual endometrioid adenocarcinoma    She was counseled to continue progesterone treatment as patient continues to desire fertility and has been following with the [facility] bariatric program for surgery    SURVEILLANCE:  IMAGING EVALUATION:  Pelvic Ultrasound (10/12/21). Transabdominal and Transvaginal Views  7.2x3.5x5.3cm uterus, with 0.72cm endometrial stripe. IUD noted  2.9x2.7x2cm RIGHT ovary  3.2x2x3.2cm LEFT ovary  No evidence of free fluid  PATHOLOGY EVALUATION:  Endometrial biopsy (10/29/21)  Endometrial stromal pseudo-decidual change. Negative for atypia or neoplasm    Ministerio-en-Y gastric bypass (03/09/22), by Dr. Jorgito Quesada ([place] [id number])    SURVEILLANCE:  IMAGING EVALUATION:  TVUS (06/06/22)  COMPARISON: 10/12/21  6.5x4.8x3.6cm uterus, with 1.0cm endometrial stripe. Slightly complex area is noted near the cervix possibly related to the patient's neoplasia. IUD noted  4.5x2.8x2.2cm RIGHT ovary, with a 1.7x1. 6x1.9cm hyperechoic area without shadowing  3.1x2. 3x1.9cm LEFT ovary  No evidence of free fluid  PATHOLOGY EVALUATION:  Endometrial biopsy (06/09/22)  Grade 1, Endometrioid adenocarcinoma, with squamous metaplasia and progesterone effect    The patient reports that Dr. Jorgito Quesada ([place] [address] bariatric surgeon) recommends conception no sooner than 18 months after the gastric bypass surgery. The patient continues to express a desire for future fertility    HEALTH MAINTENANCE:     She denies to have any history of cervix dysplasia. Last pap test normal. HPV not ordered (07/29/20, a copy of the report has been reviewed). Next due July 2023. Colonoscopy (June 2021): Records are unavailable for review  Vaccines: Tdap and Influenza (Not UTD). COVID-19 UTD x3). ? Gardisil ?   The following laboratory results were reviewed:   CBC is in the normal range. Normal Hemoglobin 14.6 (06/15/22)  CMP is in the normal range, with the exception of ALT 44 (H). Normal Creatinine 0.7 (06/15/22)  TSH and HgBA1C normal (06/15/22)  Lipid panel is normal, with the exception of HDL 28 (L) (06/15/22)  Iron, Vitamin B1, Vitamin B12, Vitamin E, Folate and PTH normal (06/15/22)  Vitamin D = 99.5 (H) (06/15/22)  Hepatitis B core Ab, Hepatitis C Ab and HIV negative (10/20/20)    PAST MEDICAL HISTORY:     Hypertension: Treated with Labetolol  PCOS: Treated with Metformin  Sleep apnea: ? CPAP ? Visual Impairment: Wears eyeglasses and contact lenses   GERD: Resolved since    Class III obesity (BMI 45): She has lost close to 80 pounds since Ministerio-en-Y gastric bypass surgery (March 2022)    PAST SURGICAL HISTORY:     Dilatation & Curettage, with hysteroscopy (11/06/19), by Dr. Leeann Denis, Mirena IUD placement (09/08/20), by Dr. Javier Dumont  Ministerio-en-Y gastric bypass surgery (March 2022), by Dr. Maggie Aguillon  EGD (June 2021): Mild gastritis based on review of pathology report    PAST OBSTETRIC HISTORY:       G0, P0. Fertility counseling visit completed at [facility]. Unable to complete cryopreservation of eggs due to financial barrier    PAST GYNECOLOGIC HISTORY:     10 / Amenorrhea for 14 years => Now monthly menstrual cycles since Ministerio-en-Y gastric bypass surgery (March 2022) / 7-10 days. She reports to have a _ year history of OCP use, a _ year Provera use and a 2 year history of IUD use. She endorses being sexually active with male partner in the past 6m. She denies to have dyspareunia or post-coital bleeding. She denies to have any history of sexually transmitted infections. PAST SOCIAL HISTORY:     The patient has completed _. She works as a _. She lives with her boyfriend, _. She reports to be independent and fully functional in her activities of daily living.  The patient endorses occasional alcohol use and reports to consume 1 servings of wine per week. She denies to have any history of tobacco or drug abuse. PAST FAMILY HISTORY:     PGM: _ cancer. Diagnosed age _ (? Alive ?  ?, age _)  PGF: _ cancer. Diagnosed age _ (? Alive ?  ?, age _)  Mother: _ cancer. Diagnosed age _ (? Alive ?  ?, age _)  Father: _ cancer. Diagnosed age _ (? Alive ?  ?, age _)    The patient denies to have any family history of melanoma, brain, breast, bladder, uterine, colon, ovarian, prostate, pancreas or stomach cancer    MEDICATIONS: MEDICATIONS HAVE BEEN RECONCILED     omeprazole (PRILOSEC) 20 MG delayed release capsule TAKE 1 CAPSULE BY MOUTH ONCE DAILY   minocycline (MINOCIN;DYNACIN) 100 MG capsule TAKE 1 CAPSULE BY MOUTH TWICE DAILY   ondansetron (ZOFRAN ODT) 4 MG disintegrating tablet Take 1 tablet by mouth every 8 hours as needed for Nausea or Vomiting   hydrOXYzine (VISTARIL) 25 MG capsule 25 mg 2 times daily as needed    labetalol (NORMODYNE) 100 MG tablet Take 100 mg by mouth 2 times daily: Taking 50mg in the am and 50 mg at bedtime for a total of 100mg   metFORMIN (GLUCOPHAGE) 500 MG tablet TAKE ONE TABLET BY MOUTH THREE TIMES DAILY   Multiple Vitamins-Minerals (MULTI FOR HER PO) Take 1 capsule by mouth daily   acetaminophen (TYLENOL) 500 MG tablet Take 2 tablets by mouth every 6 hours as needed for Pain   docusate sodium (COLACE) 100 MG capsule Take 1 capsule by mouth 2 times daily     ALLERGIES: ALLERGIES HAVE BEEN RECONCILED     Allergies   Allergen Reactions    Adhesive Tape Rash     Tegaderm, clear tape ok to use      REVIEW OF SYSTEMS:     A complete 12 point review of systems was performed and is negative except as noted  SKIN: She notes occasional \"sebaceous cysts\" under her axillae  HEME: She describes to have symptoms of easy bruising and bleeding  I have personally reviewed and agree with the review of systems performed by the ancillary staff in the EPIC documentation.     PHYSICAL EXAM:     Deferred due to telehealth visit    MEDICAL RECORDS REVIEW:     All pertinent attached records and previous notes were reviewed       The following visit notes have been reviewed:   Visit note, Dr. Arnol Saini (11/22/22 to 06/15/22) [Bariatric Surgery]  The patient was advised not to become pregnant within 12 to 18 months following bariatric surgery. Visit note, Bethany Lynn, [facility] (07/29/20 to 06/09/21) [Gynecologic Oncology]    Visit note, Camryn Bates NP (06/09/21 to 05/31/22) Petros Ervin    Visit note, Marizol Haskins NP (11/22/22 to 06/15/22) [Bariatric Surgery]    Visit note, Dr. Mike Chen (08/13/20 to 09/17/20) [Gynecologic Oncology]  I have recommended that she have a repeat D&C and placement of a Mirena, progesterone emitting IUD. We would then follow her with endometrial biopsies every 3 to 4 months until she has had several negative biopsies. During this time the patient should aggressively lose weight and become prepared for infertility treatments. Following 3 negative biopsies the patient's Mirena IUD can be removed and she can proceed to achieve or carry a pregnancy    Visit note, Waldemar Nunes CNM (01/25/16 to 07/15/20) [OB/GYN]    Visit note, Dr. Deanna Aldridge (10/14/19 to 11/18/19) [OB/GYN]  8cm uterine sound on endometrial biopsy     Visit note, Evert Severe Might, NP (05/12/15 to 08/08/17) [Family [facility]        The following imaging notes were reviewed:   EKG (08/26/20 and 01/05/22): NSR    The following pathology reports were reviewed:  Pap test (07/29/20)  Negative for intraepithelial lesion or malignancy. Endocervix component present. HPV not ordered    Pap test (06/18/19)  Negative for intraepithelial lesion or malignancy. Endocervix component absent. HPV negative    Pap test (01/25/16)  Negative for intraepithelial lesion or malignancy. Endocervix component present. HPV not ordered     The following laboratory results were reviewed:  CBC is in the normal range.  Normal Hemoglobin 14.6 (06/15/22)  CMP is in the normal range, with the exception of ALT 44 (H). Normal Creatinine 0.7 (06/15/22)  TSH and HgBA1C normal (06/15/22)  Lipid panel is normal, with the exception of HDL 28 (L) (06/15/22)  Iron, Vitamin B1, Vitamin B12, Vitamin E, Folate and PTH normal (06/15/22)  Vitamin D = 99.5 (H) (06/15/22)    ASSESSMENT:     31yo with LMP 05/23/22, with symptoms of amenorrhea. She was diagnosed with an Endometrial Intraepithelial Neoplasia (Diagnosed 2019). Progression to clinical Stage IA, grade 1, Endometrioid carcinoma of the UTERUS (Diagnosed 2020) noted. She continues to express a desire to maintain fertility. Therefore, the patient has been treated with fertility sparing management to include a Dilation & Curettage, with Mirena IUD placement (09/08/20), by Dr. Spenser Esqueda. She has demonstrated biopsies that are negative for hyperplasia and malignancy (12/18/20 and 03/25/21). Most recent endometrial biopsy demonstrates findings of recurrent grade 1, Endometrioid adenocarcinoma (06/09/22), by ALEXANDER Sepulveda. A discussion has been held to determine further management. PLAN:     As this was my 1st visit with Kathi Niño, I have extensively reviewed the medical records. A discussion was held regarding Ryan Solis's diagnosis and plan of care. She verbalized a good understanding. 1. Clinical Stage IA, grade 1, Endometrioid carcinoma of the UTERUS (Diagnosed 2020)  The patient has been counseled regarding her diagnosis using both verbal and visual descriptions. The pathology report from the endometrial biopsy has been discussed with the patient today. The patient has been counseled regarding management options. She has been informed that the standard of care would include treatment with a hysterectomy, ovarian removal and staging procedure. The patient continues to express a desire to maintain fertility.  Therefore, the patient has been counseled regarding the risks, benefits and alternatives of fertility to follow up with her PCP. DISPOSITION:     The patient verbalized understanding of our extensive discussion today and of follow up instructions. All questions were answered to her apparent satisfaction. Therefore, she agreed to proceed as planned. Return for surgical management at [facility]. The patient will be informed once the date and time of the surgery has been established. TIME SUMMARY:  Total time spent: 285 minutes. In total, 240 minutes included records review and chart completion outside of the date service (DATES: 06/20/22, 06/25/22, 06/26/22)  In total, 45 minutes included chart completion, interpretation of findings, patient counseling / teaching, coordination of care and face to face discussion with the patient today (F2F START TIME: 1515. F2F STOP TIME: 1600). RECORDS REQUEST:  1. Operative and Pathology reports from colonoscopy (2021)    Colby Celis MD  Gynecologic Oncology      ADDENDUM  July 2022: Tatiana Ureña MD  Writer called patient. She stated she has not had a colonoscopy done. She stated that she had an EGD done with her general surgeon in June of 2021.

## 2022-06-20 NOTE — BRIEF OP NOTE
Brief Operative Note  Department of Gynecology and [facility]     Patient: Yosi Vega   : 1990  MRN: [id number]       Acct: [de-identified]   Date of Procedure: 22     Pre-operative Diagnosis: 34 y.o. female    Endometrial Adenocarcinoma FIGO Grade 1  Complex Hyperplasia with Atypia  Hypertension  BMI 44.11     Post-operative Diagnosis: Same as above    Procedure: Dilation and Curettage with Aveta Hysteroscopy, Removal and Replacement of Mirena IUD    Surgeon: Dr. Devora Srteeter MD     Assistant(s): Arian Smith DO, PGY3; Claudia Villalpando DO PGY1    Anesthesia: general via ET tube    Findings:  Diffusely thickened endometrium consistent with prior diagnosis of endometrial adenocarcinoma and complex hyperplasia with atypia  Total IV fluids/Blood products:  1000 ml crystalloid  Urine Output:  Patient voided prior to OR  Estimated blood loss:  less than 10ml  Drains:  none  Specimens:  Endometrial curettings, Mirena IUD  Instrument and Sponge Count: Correct  Complications:  none  Condition:  stable, transferred to post anesthesia recovery    See full operative report for further details. Arian Smith DO  Ob/Gyn Resident  2022, 4:40 PM      Attending Physician Statement  I was present, scrubbed and participated in the entire case. I agree with the above documentation.   Electronically signed by Bird Miller MD on 2022 at 8:05 PM  Gynecologic Oncology

## 2022-06-20 NOTE — ANESTHESIA PRE PROCEDURE
Department of Anesthesiology  Preprocedure Note       Name:  Cayla Escudero   Age:  34 y.o.  :  1990                                          MRN:  [id number]         Date:  2022      Surgeon: Lina Baumann):  Julissa Hudson MD    Procedure: Procedure(s):  EUA, DILATATION AND CURETTAGE HYSTEROSCOPY, PLACEMENT OF MIRENA IUD    Department of Anesthesiology  Pre-Anesthesia Evaluation/Consultation         Name:  Cayla Escudero                                         Age:  34 y.o. MRN:  [id number]             Medications  Current Facility-Administered Medications   Medication Dose Route Frequency Provider Last Rate Last Admin    0.9 % sodium chloride infusion   IntraVENous PRN Adelaida Lyle MD        sodium chloride flush 0.9 % injection 5-40 mL  5-40 mL IntraVENous 2 times per day Julissa Hudson MD        sodium chloride flush 0.9 % injection 5-40 mL  5-40 mL IntraVENous PRN Adelaida Lyle MD           Allergies   Allergen Reactions    Adhesive Tape Rash     Tegaderm, clear tape ok to use     Patient Active Problem List   Diagnosis    Fatigue    Morbid obesity with BMI of 50.0-59.9, adult ([facility]    Essential hypertension    Nervousness    Endometrial polyp    Complex atypical endometrial hyperplasia    D&C, hysteroscopy, mirena IUD insertion     Endometrial cancer ([facility]    Endometrial adenocarcinoma ([facility]    Nausea in adult     Past Medical History:   Diagnosis Date    Cancer [facility])     endometrial  diagnosed  2020  Dr. Diamond Farooq GERD (gastroesophageal reflux disease)     resolved since bariatric sx     Hypertension     R.  Fruth-NP    Obesity     PCOS (polycystic ovarian syndrome)     on metformin    Sleep apnea     cpap    Snores     Under care of team     Dr. Matthew Galindo [place]  Bariatric  last seen 2022    Under care of team     Perri Mae  sleep doc   [place], [place] last seen     Wears contact lenses     Wears prescription eyeglasses     Wellness examination Vicky Smith PCP last visit   sees q 6 mos-[place]     Past Surgical History:   Procedure Laterality Date    BARIATRIC SURGERY      3/9/2022   Dr. Teresa Velez in [facility],, [facility] N/A 2019    DILATATION AND CURETTAGE HYSTEROSCOPY-POLYPECTOMY performed by Kamran Sadler MD at [address] [place] [address]  2020    DILATATION AND CURETTAGE, MIRENA PLACEMENT     DILATION AND CURETTAGE OF UTERUS N/A 2020    DILATATION AND CURETTAGE, MIRENA PLACEMENT performed by Radhika Pan MD at [place] [address], COLON, DIAGNOSTIC      2021    HYSTEROSCOPY      D&C with polypectomy     Social History     Tobacco Use    Smoking status: Never Smoker    Smokeless tobacco: Never Used   Vaping Use    Vaping Use: Never used   Substance Use Topics    Alcohol use: Not Currently     Alcohol/week: 0.0 standard drinks     Types: 1 Glasses of wine, 1 Standard drinks or equivalent per week     Comment: Occ    Drug use: No         Vital Signs (Current)   Vitals:    22 1115   BP: (!) 138/100   Pulse: 67   Resp: 18   Temp: 97.7 °F (36.5 °C)   SpO2: 98%     Vital Signs Statistics (for past 48 hrs)     Temp  Av.7 °F (36.5 °C)  Min: 97.7 °F (36.5 °C)   Min taken time: 22 111  Max: 97.7 °F (36.5 °C)   Max taken time: 22 1115  Pulse  Av  Min: 79   Min taken time: 22 1115  Max: 79   Max taken time: 22 1115  Resp  Av  Min: 25   Min taken time: 22 1115  Max: 18   Max taken time: 22 1115  BP  Min: 138/100   Min taken time: 22 1115  Max: 138/100   Max taken time: 22 111  SpO2  Av %  Min: 98 %   Min taken time: 22 111  Max: 98 %   Max taken time: 22 1115  BP Readings from Last 3 Encounters:   22 (!) 138/100   22 (!) 142/97   22 (!) 158/101       BMI  Body mass index is 44.11 kg/m².     CBC   Lab Results   Component Value Date    WBC 10.6 2022    RBC 5.07 2022    HGB 13.2 01/05/2022    HCT 41.3 01/05/2022    MCV 81.5 01/05/2022    RDW 13.9 01/05/2022     01/05/2022       CMP    Lab Results   Component Value Date     01/05/2022    K 4.3 01/05/2022     01/05/2022    CO2 24 01/05/2022    BUN 23 01/05/2022    CREATININE 0.74 01/05/2022    GFRAA >60 01/05/2022    LABGLOM >60 01/05/2022    GLUCOSE 102 01/05/2022    PROT 7.8 11/12/2021    CALCIUM 9.8 01/05/2022    BILITOT 0.52 11/12/2021    ALKPHOS 63 11/12/2021    AST 28 11/12/2021    ALT 38 11/12/2021       BMP    Lab Results   Component Value Date     01/05/2022    K 4.3 01/05/2022     01/05/2022    CO2 24 01/05/2022    BUN 23 01/05/2022    CREATININE 0.74 01/05/2022    CALCIUM 9.8 01/05/2022    GFRAA >60 01/05/2022    LABGLOM >60 01/05/2022    GLUCOSE 102 01/05/2022       POC Testing  Recent Labs     06/20/22  1135   POCGLU 87       Coags    Lab Results   Component Value Date    PROTIME 14.0 01/05/2022    INR 1.1 01/05/2022       HCG (If Applicable)   Lab Results   Component Value Date    PREGTESTUR negative 06/09/2022    HCGQUANT <1 10/14/2019        ABGs No results found for: PHART, PO2ART, HUX2BXW, BQR8IUE, BEART, L2ZPZGSH     Type & Screen (If Applicable)  No results found for: LABABO, [address]    Radiology (If Applicable)    Cardiac Testing (If Applicable) EF 50%    EKG (If Applicable) ? [facility]          Medications prior to admission:   Prior to Admission medications    Medication Sig Start Date End Date Taking?  Authorizing Provider   omeprazole (PRILOSEC) 20 MG delayed release capsule TAKE 1 CAPSULE BY MOUTH ONCE DAILY 4/9/22   Historical Provider, MD   minocycline (MINOCIN;DYNACIN) 100 MG capsule TAKE 1 CAPSULE BY MOUTH TWICE DAILY 7/12/21   Historical Provider, MD   ondansetron (ZOFRAN ODT) 4 MG disintegrating tablet Take 1 tablet by mouth every 8 hours as needed for Nausea or Vomiting 10/8/20   Guicho Melendez MD   ibuprofen (ADVIL;MOTRIN) 200 MG tablet Take 200 mg by mouth every 6 hours as needed for Pain PT. Takes PRN  Patient not taking: Reported on 7/15/2021    Historical Provider, MD   hydrOXYzine (VISTARIL) 25 MG capsule 25 mg 2 times daily as needed  7/24/20   Historical Provider, MD   labetalol (NORMODYNE) 100 MG tablet Take 100 mg by mouth 2 times daily Pt is taking 50mg in the am and 50 mg at bedtime for a total of 100mg    Historical Provider, MD   metFORMIN (GLUCOPHAGE) 500 MG tablet TAKE ONE TABLET BY MOUTH THREE TIMES DAILY 8/28/19   Slime Izaguirre, APRN - CNM   Multiple Vitamins-Minerals (MULTI FOR HER PO) Take 1 capsule by mouth daily 2/22/16   Historical Provider, MD       Current medications:    Current Facility-Administered Medications   Medication Dose Route Frequency Provider Last Rate Last Admin    0.9 % sodium chloride infusion   IntraVENous PRN Bernadette Lyle MD        sodium chloride flush 0.9 % injection 5-40 mL  5-40 mL IntraVENous 2 times per day Bernadette Lyle MD        sodium chloride flush 0.9 % injection 5-40 mL  5-40 mL IntraVENous PRN Drake Ramirez MD           Allergies: Allergies   Allergen Reactions    Adhesive Tape Rash     Tegaderm, clear tape ok to use       Problem List:    Patient Active Problem List   Diagnosis Code    Fatigue R53.83    Morbid obesity with BMI of 50.0-59.9, adult ([facility] E66.01, Z68.43    Essential hypertension I10    Nervousness R45.0    Endometrial polyp N84.0    Complex atypical endometrial hyperplasia N85.02    D&C, hysteroscopy, mirena IUD insertion 9/8 Z98.890    Endometrial cancer ([address] C54.1    Endometrial adenocarcinoma ([address] C54.1    Nausea in adult R11.0       Past Medical History:        Diagnosis Date    Cancer [facility])     endometrial  diagnosed  fall 2020  Dr. Pietro Sheldon GERD (gastroesophageal reflux disease)     resolved since bariatric sx     Hypertension     R.  Fruth-NP    Obesity     PCOS (polycystic ovarian syndrome)     on metformin    Sleep apnea     cpap    Snores     Under care of team     Dr. Roz Higginbotham [place]  Bariatric  last seen 6/2022    Under care of team     Dmitriy Edwards  sleep doc   [place], [place] last seen 2022    Wears contact lenses     Wears prescription eyeglasses     Wellness examination     KAYLA Lay-TK PCP last visit 2022  sees q 6 mos-[place]       Past Surgical History:        Procedure Laterality Date    BARIATRIC SURGERY      3/9/2022   Dr. Reyes Quan in [facility],, [address] [place] [address] N/A 11/6/2019    DILATATION AND CURETTAGE HYSTEROSCOPY-POLYPECTOMY performed by Teresita Schneider MD at [address]  09/08/2020    DILATATION AND CURETTAGE, MIRENA PLACEMENT     DILATION AND CURETTAGE OF UTERUS N/A 9/8/2020    DILATATION AND CURETTAGE, MIRENA PLACEMENT performed by Agnes Lombardo MD at [facility], DIAGNOSTIC      6/2021    HYSTEROSCOPY      D&C with polypectomy       Social History:    Social History     Tobacco Use    Smoking status: Never Smoker    Smokeless tobacco: Never Used   Substance Use Topics    Alcohol use: Not Currently     Alcohol/week: 0.0 standard drinks     Types: 1 Glasses of wine, 1 Standard drinks or equivalent per week     Comment:  Occ                                Counseling given: Not Answered      Vital Signs (Current):   Vitals:    06/17/22 0958 06/20/22 1115   BP:  (!) 138/100   Pulse:  67   Resp:  18   Temp:  97.7 °F (36.5 °C)   TempSrc:  Temporal   SpO2:  98%   Weight: 257 lb (116.6 kg)    Height: 5' 4\" (1.626 m)                                               BP Readings from Last 3 Encounters:   06/20/22 (!) 138/100   06/09/22 (!) 142/97   01/05/22 (!) 158/101       NPO Status: Time of last liquid consumption: 2100                        Time of last solid consumption: 2100                        Date of last liquid consumption: 06/19/22                        Date of last solid food consumption: 06/19/22    BMI:   Wt Readings from Last 3 Encounters:   06/17/22 257 lb (116.6 kg)   06/09/22 263 lb (119.3 kg)   10/29/21 (!) 332 lb (150.6 kg)     Body mass index is 44.11 kg/m². CBC:   Lab Results   Component Value Date    WBC 10.6 01/05/2022    RBC 5.07 01/05/2022    HGB 13.2 01/05/2022    HCT 41.3 01/05/2022    MCV 81.5 01/05/2022    RDW 13.9 01/05/2022     01/05/2022       CMP:   Lab Results   Component Value Date     01/05/2022    K 4.3 01/05/2022     01/05/2022    CO2 24 01/05/2022    BUN 23 01/05/2022    CREATININE 0.74 01/05/2022    GFRAA >60 01/05/2022    LABGLOM >60 01/05/2022    GLUCOSE 102 01/05/2022    PROT 7.8 11/12/2021    CALCIUM 9.8 01/05/2022    BILITOT 0.52 11/12/2021    ALKPHOS 63 11/12/2021    AST 28 11/12/2021    ALT 38 11/12/2021       POC Tests:   Recent Labs     06/20/22  1135   POCGLU 87       Coags:   Lab Results   Component Value Date    PROTIME 14.0 01/05/2022    INR 1.1 01/05/2022       HCG (If Applicable):   Lab Results   Component Value Date    PREGTESTUR negative 06/09/2022    HCGQUANT <1 10/14/2019        ABGs: No results found for: PHART, PO2ART, HEA2IMO, EZJ5GKI, BEART, M6SCYUWJ     Type & Screen (If Applicable):  No results found for: LABABO, LABRH    Drug/Infectious Status (If Applicable):  Lab Results   Component Value Date    HEPCAB NONREACTIVE 10/20/2020       COVID-19 Screening (If Applicable):   Lab Results   Component Value Date    COVID19 Not Detected 01/05/2022    COVID19 Not Detected 09/04/2020    COVID19 Not Detected 04/03/2020           Anesthesia Evaluation   no history of anesthetic complications:   Airway: Mallampati: III     Neck ROM: full     Dental:          Pulmonary:   (+) sleep apnea: on CPAP,      (-) recent URI and not a current smoker                           Cardiovascular:    (+) hypertension:,                   Neuro/Psych:      (-) seizures           GI/Hepatic/Renal:   (+) GERD:,          ROS comment: Bariatric surgery. Endo/Other:    (+) malignancy/cancer.     (-) diabetes mellitus               Abdominal: Vascular: Other Findings:           Anesthesia Plan      general     ASA 4       Induction: intravenous.                             Pito Mcdonald MD   6/20/2022

## 2022-06-20 NOTE — ANESTHESIA POSTPROCEDURE EVALUATION
Department of Anesthesiology  Postprocedure Note    Patient: Saint Blinks  MRN: [id number]  [place]: 1990  Date of evaluation: 6/20/2022      Procedure Summary     Date: 06/20/22 Room / Location: [address]    Anesthesia Start: 1530 Anesthesia Stop: 1626    Procedure: EUA, DILATATION AND CURETTAGE HYSTEROSCOPY, PLACEMENT OF MIRENA IUD (N/A ) Diagnosis:       Endometrial cancer ([address]      (ENDOMETRIAL CANCER)    Surgeons: Jett Mcqueen MD Responsible Provider: Yogesh Arreaga MD    Anesthesia Type: general ASA Status: 4          Anesthesia Type: No value filed. Rebecca Phase I: Rebecca Score: 10    Rebecca Phase II: Rebecca Score: 10    Last vitals:   Vitals Value Taken Time   /100 06/20/22 1705   Temp 97 °F (36.1 °C) 06/20/22 1700   Pulse 71 06/20/22 1707   Resp 23 06/20/22 1707   SpO2 98 % 06/20/22 1707   Vitals shown include unvalidated device data.     POST-OP ANESTHESIA NOTE       BP (!) 148/96   Pulse 78   Temp 97 °F (36.1 °C)   Resp 17   Ht 5' 4\" (1.626 m)   Wt 257 lb (116.6 kg)   LMP 05/23/2022 (Approximate)   SpO2 99%   Breastfeeding No Comment: hcg-neg  BMI 44.11 kg/m²    Pain Assessment: None - Denies Pain  Pain Level: 5     Anesthesia Post Evaluation    Patient location during evaluation: PACU  Patient participation: complete - patient participated  Level of consciousness: awake  Pain score: 5  Airway patency: patent  Nausea & Vomiting: no vomiting and no nausea  Complications: no  Cardiovascular status: hemodynamically stable  Respiratory status: acceptable  Hydration status: stable

## 2022-06-21 ENCOUNTER — TELEPHONE (OUTPATIENT)
Dept: GYNECOLOGIC ONCOLOGY | Age: 32
End: 2022-06-21

## 2022-06-21 NOTE — OP NOTE
OPERATIVE NOTE    PATIENT: Shantanu Head OF BIRTH: 1990   MRN: [id number]  DATE OF PROCEDURE: 06/20/2022     PRE-OP DIAGNOSIS:   1. Grade 1, Endometrioid adenocarcinoma of the UTERUS  2. Mirena IUD treatment  3. Desires fertility preservation  4. Hypertension  5. Sleep apnea, compliant with CPAP  6. 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 Class III obesity (BMI 44)     POST-OP DIAGNOSIS:   1. Grade 1, Endometrioid adenocarcinoma of the UTERUS  2. Mirena IUD treatment  3. Desires fertility preservation  4. Hypertension  5. Sleep apnea, compliant with CPAP  6. 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 Class III obesity (BMI 44)       PROCEDURE:  Exam under anesthesia + Dilation & Curettage, with hysteroscopy + Mirena IUD replacement     SURGEON: Braden Dotson MD     ASSISTANT: Taye Ugalde DO;   Ilana Lovett DO     ANESTHESIA: General     ESTIMATED BLOOD LOSS (mL): 01AP     COMPLICATIONS: None noted at the end of the procedure     SPECIMENS:   1. Gross only Mirena IUD  2. Endometrial curettings     INDICATIONS:  33yo with LMP 05/03/22, with a Grade 1, Endometrioid adenocarcinoma of the uterus. She has expressed a desire for fertility preservation. Therefore, she has been treated with a Mirena IUD. Most recent endometrial biopsy demonstrates a Grade 1, Endometrioid Adenocarcinoma (06/09/22). Recommendations were made to proceed with surgical evaluation, with replacement of Mirena IUD. She has been advised of the potential risks, benefits and alternatives to the procedure. Informed consent was obtained     FINDINGS:   Vulvar exam demonstrates no gross lesions noted. The cervix and vagina are normal appearing. IUD strings were visualized. 8cm uterine sound. On hysteroscopy, thick fluffy vascular tissue was noted to fill the endometrial cavity. Bilateral tubal ostia was visualized.      DETAILED DESCRIPTION OF PROCEDURE:   The patient was identified and transported to the operating room with IV fluids running. Appropriate monitors were affixed. Bilateral SCDs were on and in place prior to induction of anesthesia. Following administration of general anesthesia, the patient was intubated by the anesthesia team without difficulty. She was then placed in the dorsal lithotomy position. She was properly secured, prepped and draped in the normal sterile fashion. A surgical timeout was taken to verify the correct patient, procedure, site and precautions. Tere retractors were placed in the vagina. The anterior lip of the cervix was grasped with a single-tooth tenaculum. The IUD strings were grasped and the IUD was removed prior to the initiation of the procedure. The cervix was then sequentially dilated with Hanks dilators to allow a 5.6mm Aveta hysteroscope to be inserted with the findings noted above. The Aveta resection device was inserted and the thickened fluffy endometrial tissue was completely removed, which resulted in a smooth endometrial cavity. All tissue was submitted for pathology evaluation as Endometrial Curettings. Uterine was sounded to 8cm. Next the Mirena IUD was placed under  guidelines. At the conclusion of the procedure, sponge, needle and instrument counts were given to be correct by the circulating nurse and scrub tech. The estimated blood loss was 10cc. The volume deficit was recorded. The patient was awakened, LMA was removed and then she was transferred to the recovery room in satisfactory condition.  A post-procedure pause confirmed the procedure, specimens, EBL and counts         Chata Cespedes MD   Gynecologic Oncology

## 2022-06-23 LAB — SURGICAL PATHOLOGY REPORT: NORMAL

## 2022-06-24 NOTE — RESULT ENCOUNTER NOTE
Scant fragments of at most Grade 1, Endometrioid adenocarcinoma, with Progesterone change noted on Dilation & Curettage, with hysteroscopy (06/21/22). Plan to discuss results in detail at upcoming visit. Given the recurrence of the endometrial malignancy (negative endometrial biopsies noted on 10/29/21 and 03/25/21 and 12/18/20. Focus of residual carcinoma noted on 07/15/21 biopsy), I would recommend for the patient to initiate treatment with an additional progesterone agent (Megace 80mg daily. This can be increased to 160mg daily if malignancy remains present at the 3 month follow up endometrial biopsy). I have changed my opinion based on recent data and would recommend against Provera use due to a higher dose needed to achieve similar treatment effect (Provera 500mg daily needed). The Megace can be initiated at post-operative visit on 07/01/22.

## 2022-06-25 PROCEDURE — 99358 PROLONG SERVICE W/O CONTACT: CPT | Performed by: OBSTETRICS & GYNECOLOGY

## 2022-06-25 PROCEDURE — 99359 PROLONG SERV W/O CONTACT ADD: CPT | Performed by: OBSTETRICS & GYNECOLOGY

## 2022-06-26 PROCEDURE — 99358 PROLONG SERVICE W/O CONTACT: CPT | Performed by: OBSTETRICS & GYNECOLOGY

## 2022-07-01 ENCOUNTER — TELEMEDICINE (OUTPATIENT)
Dept: GYNECOLOGIC ONCOLOGY | Age: 32
End: 2022-07-01

## 2022-07-01 DIAGNOSIS — Z98.890 HISTORY OF HYSTEROSCOPY: ICD-10-CM

## 2022-07-01 DIAGNOSIS — C54.1 ENDOMETRIAL ADENOCARCINOMA (HCC): Primary | ICD-10-CM

## 2022-07-01 PROCEDURE — 99024 POSTOP FOLLOW-UP VISIT: CPT | Performed by: PHYSICIAN ASSISTANT

## 2022-07-01 RX ORDER — MEGESTROL ACETATE 40 MG/1
80 TABLET ORAL DAILY
Qty: 180 TABLET | Refills: 0 | Status: SHIPPED | OUTPATIENT
Start: 2022-07-01 | End: 2022-11-03

## 2022-07-01 ASSESSMENT — ENCOUNTER SYMPTOMS
EYES NEGATIVE: 1
GASTROINTESTINAL NEGATIVE: 1
RESPIRATORY NEGATIVE: 1

## 2022-07-01 NOTE — PROGRESS NOTES
2022    TELEHEALTH EVALUATION -- Audio/Visual (During MFOTQ-09 public health emergency)    HPI:  Lucio Carrel (:  1990) has requested an audio/video evaluation for the following concern(s):     She is a pleasant [de-identified] female who was following with her local OB/GYN for long standing history of amenorrhea. She was then found to have complex endometrial hyperplasia with atypia on D&C in 2019. She was advised to have a pelvic ultrasound which was obtained on 2020 and revealed a thickened endometrial stripe at 1.6 cm. It was discussed in detail at this time that the patient was desiring future fertility. She wanted to approach her symptoms and diagnoses with fertility-sparing measures. It was also advised that she begin the work up and process for infertility and weight management to aid in her desires. Therefore recommendations for fertility sparing treatment include hormonal therapy. Pt was advised to have a pelvic MRI and plan for a repeat D&C and progesterone IUD.  was within normal limits at 6 units on 2020. Pelvic MRI on 2020 for further evaluation of the uterus measuring 3.6 x 4.7 x 7.1 cm, the endometrial measures 18 mm in AP thickness with cystic changes noted. No evidence of invasive disease. She was then taken to the OR on 2020 for a D&C and Mirena IUD placement. Final pathology revealed a well-differentiated endometrioid carcinoma, FIGO grade 1 in the background of atypical complex hyperplasia. She was advised to have serial transvaginal ultrasound and endometrial biopsies every 3 months for 3 consecutive negative biopsy results. Repeat endometrial biopsy on 7/15/21 revealed small foci of residual complex atypical gland most compatible with residual endometrioid adenocarcinoma.  She was counsled to continue progesterone treatment as patient continues to desire fertility and has been following with the [facility] program for surgery. 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 Carine Lemons. Most recent pelvic ultrasound on 6/6/2022 revealed endometrial stripe 10 mm. arabella Slightly complexity noted near the cervix. Right ovary contains a 1.7 x 1.6 x 1.9 cm hypoechoic area without shadowing. The right ovary is within normal limits. There is no free fluid. Endometrial biopsy on 6/9/22 was unfortunately positive for well differentiated endometrioid adenocarcinoma. She returned to clinic and met with Dr. Rosa Mensah Gynecology Oncologist to discuss management recommendations and the patient did wish to continue uterine conservation if possible. Therefore she underwent a hysteroscopy, dilation and curettage with IUD placement on 6/20/22. Final pathology revealed an inactive endometrium with progestational effect. Scant fragments of atypical glands concerning for residual endometrioid adenocarcinoma, grade 1. Today, she is doing very well. She has no abdominal of pelvic pain. She states she has no vaginal bleeding. She has no nausea or vomiting. She started a new job after her bariatric surgery and she is pleased because this has decreased a level of stress in her life. She is scheduled to meet with genetic counseling for diagnosis of endometrial cancer under age 48. Review of Systems    Prior to Visit Medications    Medication Sig Taking?  Authorizing Provider   megestrol (MEGACE) 40 MG tablet Take 2 tablets by mouth daily Yes Lluvia Mejia PA-C   acetaminophen (TYLENOL) 500 MG tablet Take 2 tablets by mouth every 6 hours as needed for Pain Yes Ernestine Lovett, DO   docusate sodium (COLACE) 100 MG capsule Take 1 capsule by mouth 2 times daily Yes Carrie Render, DO   omeprazole (PRILOSEC) 20 MG delayed release capsule TAKE 1 CAPSULE BY MOUTH ONCE DAILY Yes Historical Provider, MD   minocycline (MINOCIN;DYNACIN) 100 MG capsule TAKE 1 CAPSULE BY MOUTH TWICE DAILY Yes Historical Provider, MD   ondansetron (ZOFRAN ODT) 4 MG disintegrating tablet Take 1 tablet by mouth every 8 hours as needed for Nausea or Vomiting Yes Laurie Chopra MD   hydrOXYzine (VISTARIL) 25 MG capsule 25 mg 2 times daily as needed  Yes Historical Provider, MD   labetalol (NORMODYNE) 100 MG tablet Take 100 mg by mouth 2 times daily Pt is taking 50mg in the am and 50 mg at bedtime for a total of 100mg Yes Historical Provider, MD   metFORMIN (GLUCOPHAGE) 500 MG tablet TAKE ONE TABLET BY MOUTH THREE TIMES DAILY Yes ERLINDA Dennis Ala, CNM   Multiple Vitamins-Minerals (MULTI FOR HER PO) Take 1 capsule by mouth daily Yes Historical Provider, MD       Social History     Tobacco Use    Smoking status: Never Smoker    Smokeless tobacco: Never Used   Vaping Use    Vaping Use: Never used   Substance Use Topics    Alcohol use: Not Currently     Alcohol/week: 0.0 standard drinks     Types: 1 Glasses of wine, 1 Standard drinks or equivalent per week     Comment: Occ    Drug use: No            PHYSICAL EXAMINATION:  [ INSTRUCTIONS:  \"[x]\" Indicates a positive item  \"[]\" Indicates a negative item  -- DELETE ALL ITEMS NOT EXAMINED]  Vital signs were unable to be obtained by patient today    Constitutional: [x] Appears well-developed and well-nourished [x] No apparent distress      [] Abnormal-   Mental status  [x] Alert and awake  [x] Oriented to person/place/time [x]Able to follow commands      Eyes:  EOM    [x]  Normal  [] Abnormal-  Sclera  [x]  Normal  [] Abnormal -         Discharge [x]  None visible  [] Abnormal -    HENT:   [x] Normocephalic, atraumatic.   [] Abnormal   [x] Mouth/Throat: Mucous membranes are moist.     External Ears [x] Normal  [] Abnormal-     Neck: [x] No visualized mass     Pulmonary/Chest: [x] Respiratory effort normal.  [x] No visualized signs of difficulty breathing or respiratory distress        [] Abnormal-      Musculoskeletal:   [x] Normal gait with no signs of ataxia         [x] Normal range of motion of neck        [] Abnormal-       Neurological:        [x] No Facial Asymmetry (Cranial nerve 7 motor function) (limited exam to video visit)          [x] No gaze palsy        [] Abnormal-         Skin:        [x] No significant exanthematous lesions or discoloration noted on facial skin         [] Abnormal-            Psychiatric:       [x] Normal Affect [x] No Hallucinations        [] Abnormal-     Discussion: We then reviewed in detail the patient's most recent surgical pathology from the dilation and curettage. The pathology was consistent with scant fragments of atypical glands concerning for residual endometrioid adenocarcinoma, FIGO grade 1, showing progestational change. Given the recurrence of her endometrial cancer on previous endometrial biopsies I then reviewed in detail Dr. Tereasa Sicard recommendations to proceed with additional progesterone therapy using Megace 80 mg once daily. This will be in conjunction with her progesterone emitting IUD. The goal would be to repeat an endometrial biopsy in 3 months. If there is persistent malignancy after 3 months, recommendations would be to increase the megace dosing to 160 mg daily. The patient voiced understanding and is agreeable to begin taking Megace. She does have concerns about the side effect profile related to weight gain or appetite stimulant given her recent weight loss surgery and journey to continue more intentional weight loss. I did express understanding to the patient and she will be mindful of her body adjusting to the medications and that her starting dose is relatively low. We did review other side effect profile with potential for clotting and reviewed signs and symptoms to be watchful of. The patient voiced understanding.      She then disclosed her thoughts \"if there is cancer after three months I think I have decided I would proceed with a hysterectomy at this point\"  She was very candid in sharing that she has spent time discussing her health with her partner and family and feels her health is of utmost importance to her as opposed to fertility. She is also excited explaining that she would find other ways to become a parent in the future if she decided to proceed with a hysterectomy. ASSESSMENT/PLAN:  1. Endometrial adenocarcinoma, s/p removal of IUD, hysteroscopy D&C and placement of Mirena IUD    Return in about 3 months (around 10/1/2022). Tino Robert, was evaluated through a synchronous (real-time) audio-video encounter. The patient (or guardian if applicable) is aware that this is a billable service, which includes applicable co-pays. This Virtual Visit was conducted with patient's (and/or legal guardian's) consent. The visit was conducted pursuant to the emergency declaration under the [facility], [address] [place] [address] [facility] authority and the [employer] and [place] General Act. Patient identification was verified, and a caregiver was present when appropriate. The patient was located at Home: [address] [address]. Provider was located at [facility] ([address] [place] [address]): [address],  [address]. Total time spent on this encounter: 16 minutes    --Delbert Ellis PA-C on 7/1/2022 at 2:05 PM    An electronic signature was used to authenticate this note.

## 2022-07-12 ENCOUNTER — TELEPHONE (OUTPATIENT)
Dept: GYNECOLOGIC ONCOLOGY | Age: 32
End: 2022-07-12

## 2022-07-12 NOTE — TELEPHONE ENCOUNTER
Pt called stating that since starting megace she has been experiencing increased appetite and weight gain. She spoke with her bi-pass surgeon who suggested to her to call our provider and request pt be prescribed an appetite suppressant. Pt's pharmacy is [facility] on [address]. Please advise pt.

## 2022-07-13 NOTE — TELEPHONE ENCOUNTER
Returned call to office and discussed care plan. Patient states she feels she is generally tolerating the Megace as far as no nausea however the increased appetite is affecting her daily mindset and weight loss journey. Reviewed with patient will discuss with her PCP Tarun Cristobal and notify patient of care plan. She voiced understanding.

## 2022-07-13 NOTE — TELEPHONE ENCOUNTER
Pt returned call. Writer read provider response, pt accepted information, but would still like to discuss over the phone. Please return call.

## 2022-07-13 NOTE — TELEPHONE ENCOUNTER
Left voicemail for patient expressing that Dr. Albert Ware and myself are OK to have her start an appetite suppressant however we are not normally prescribes of this medication regarding dosing, follow up, etc. I would recommend this medication come either from her PCP if they are comfortable prescribing or the weight loss management clinic either as [facility] or we can get her established locally. Asked patient to return call to discuss her care plan.

## 2022-07-14 NOTE — TELEPHONE ENCOUNTER
I have spoken with the patient's PCP Marquita Navarro and we had a conversation regarding the patient's management plan for appetite suppressant medication. [facility] states that patient can be treated at her office for appetite suppressant medication. I then called daily to notify her of this and she should be hearing from her PCP office if not I advised her to call them in order to set up appointment as soon as possible. She will return to see me end of September and she can call if she has any questions or concerns in the interim.

## 2022-07-15 NOTE — PROGRESS NOTES
3 [facility]   Hereditary Cancer Risk Assessment     Name: Elvira Damico   YOB: 1990   Date of Consultation: 7/18/22    Ms. Justyna Jackman was seen at the [facility] for genetic counseling on 7/15/22. Ms. Justyna Jackman was referred by Darlyn Muñoz PA-C due to her personal history of endometrial cancer. PERSONAL HISTORY   Ms. Justyna Jackman is a 34 y.o.  female who was previously diagnosed with endometrial cancer. In 2019 she presented with complex endometrial hyperplasia with atypia on D&C. She underwent fertility sparing treatment though hormone therapy. She underwent repeat D&C in 2020 and final pathology revealed a well differentiated endometrioid carcinoma, FIGO grade 1. She is currently under observation. Mismatch repair analysis has not been performed at this time, likely due to the small sample size. Total hysterectomy is not planned at this time. FAMILY HISTORY  Ms. Justyna Jackman has two full brothers and one full sister. She has one paternal half brother. Ms. El Hanson mother is living, no cancer aside from a skin cancer on her face. She has a total of one maternal aunt, and one maternal uncle. Her uncle is currently getting tested for cancer; but there are no other details known at this time. Her maternal grandmother did not have cancer; however, she was one of 16 siblings. It is possible that there are extended relatives with cancer; but there are no details known. Ms. El Hanson father is living and has a history of testicular cancer in his 45s. He is also getting tested for prostate cancer currently. She has a total of 4 paternal aunts and 3 paternal uncles. One of the aunts passed away from an unknown cancer in her late 46s. Another aunt had skin cancer. One of the uncles passed away from an unknown cancer. Her paternal grandmother had bone cancer and her paternal grandfather had metastatic cancer in his 46s.      Ms. Justyna Jackman reports [place] and Najma Cali See ([facility]) and [place] Garcia foot [place] and [place] ancestry and denies any known Ashkenazi Sikh heritage. RISK ASSESSMENT   We discussed that approximately 5-10% of cancers are due to a hereditary gene mutation which causes an increased risk for certain cancers. Hereditary cancers are typically diagnosed at younger ages (under age 46y) and occur in multiple generations of a family. Multiple individuals with the same type of cancer (example: breast or colorectal) or uncommon cancers (example: ovarian, pancreatic, male breast cancer) are also features of hereditary cancers. In summary, Ms. Baig Margret 82 (NCCN) guidelines for genetic testing of the Fuentes syndrome genes due to her personal diagnosis of uterine cancer under age 48. She does have numerous relatives on each side of her family with cancer; however, there are limited details regarding the types and ages of diagnosis. Therefore, it is difficult to determine if any of them increase the chances of a hereditary gene mutation in the family. The NCCN guidelines also recognize that an individual's personal and/or family history may be explained by more than one inherited cancer syndrome. Thus, a multi-gene panel may be more efficient, more cost effecting, and increases the yield of detecting a hereditary mutation which would impact medical management. DISCUSSION  We discussed that the Funetes syndrome genes (MLH1, MSH2, MSH6, and PMS2) are the most common genes associated with hereditary colorectal and endometrial cancer. We also discussed that genetic testing is available for multiple other genes related to hereditary cancer. Some of these genes are known to carry a significant increased risk for several cancers including colon, breast, uterine, ovarian, stomach, and pancreatic cancer, while some of these genes are believed to have a moderate increased risk for breast and other cancers.  We discussed the possibility of finding a mutation in genes with limited information to guide medical management, as well we as the possibility of identifying variants of uncertain significance (VUS). We discussed the risks, benefits, and limitations of genetic testing. Possible test results were discussed as well as potential screening and prevention strategies. Specifically, we discussed:    1) Possible recommendations for prophylactic hysterectomy for results which suggest an increased risk for endometrial cancer  2) Increased colon cancer surveillance by colonoscopy screening every 1-2 years beginning by age 18-19y    Lastly, we discussed that the results of Ms. Solis's genetic testing may be beneficial in defining her risk for cancer as well as for her family members. SUMMARY & PLAN  1) Ms. Solis meets the NCCN criteria for genetic testing based on her diagnosis of endometrial cancer under age 48. She has a significant family history of cancer; however, there are limited details regarding the types and ages of diagnosis. 2) Genetic testing via a multi-gene panel was recommended and offered to Ms. Solis. 3) Ms. Patricia Restrepo elected to proceed with the CancerNext Expanded + RNA Insight Hereditary Cancer Gene Panel. 4) Ms. Patricia Restrepo is aware that she will receive a notification from the laboratory [facility]) if the out of pocket cost for testing exceeds $100 (based on individual insurance plan) and the option to proceed with the self pay price of $249.     5) Informed consent was obtained and a blood sample was sent to [facility]. We will call Ms. Solis with results as soon as they are available. A follow up appointment may be recommended. A summary letter with results and final medical management recommendations will be sent once available. A total of 35 minutes were spent face to face with Ms. Solis and 50% of the time was spent educating and counseling.      The [facility] would be glad to offer our assistance should you have any questions or concerns about this information. Please feel free to contact us at [phone]. Dax Hopkins.  Darlene Heller MS, [facility]   Licensed Genetic Counselor

## 2022-07-18 ENCOUNTER — OFFICE VISIT (OUTPATIENT)
Dept: ONCOLOGY | Age: 32
End: 2022-07-18
Payer: COMMERCIAL

## 2022-07-18 DIAGNOSIS — C54.1 ENDOMETRIAL ADENOCARCINOMA (HCC): Primary | ICD-10-CM

## 2022-07-18 PROCEDURE — 96040 PR GENETIC COUNSELING, EACH 30 MIN: CPT | Performed by: GENETIC COUNSELOR, MS

## 2022-08-08 ENCOUNTER — TELEPHONE (OUTPATIENT)
Dept: ONCOLOGY | Age: 32
End: 2022-08-08

## 2022-08-08 NOTE — TELEPHONE ENCOUNTER
Left message for [facility] notifying her that her genetic test results are available. Requested that she return my phone call at her earliest convenience to review results.

## 2022-08-09 NOTE — TELEPHONE ENCOUNTER
3 [facility]   Hereditary Cancer Risk Assessment     Name: Florecita Leihg  YOB: 1990  Date of Results Disclosure: 08/09/22      HISTORY   Ms. Lilo Nava was seen for genetic counseling at the request of Everton Flowers PA-C due to her personal history of early onset endometrial cancer. At that time, Ms. Lilo Nava chose to pursue genetic testing via the CancerNext Expanded + RNA gene panel. These results were discussed with Ms. Solis via telephone. A summary of Ms. Solis's results and recommendations are below. RESULTS  [employer] CancerNext-Expanded Panel + RNAinsight: NEGATIVE - NO CLINICALLY SIGNIFICANT MUTATIONS DETECTED   This panel included the analysis of 77 genes associated with hereditary cancer including: AIP, ALK, APC, SOLANGE, BAP1, BARD1, BLM, BMPR1A, BRCA1, BRCA2, BRIP1, CDC73, CDH1, CDK4, CDKN1B, CDKN2A, CHEK2, CTNNA1, DICER1, EGFR, EGLN1, EPCAM, FANCC, FH, FLCN, GALNT12, GREM1, HOXB13, KIF1B, KIT1, LZTR1, MAX, MEN1, MET, MITF, MLH1, MSH2, MSH3, MSH6, MUTYH, NBN, NF1, NF2, NTHL1, PALB2, PDGFRA, PHOX2B, PMS2, POLD1, POLE, POT1, BBHTS5V, PTCH1, PTEN, RAD51C, RAD51D, RB1, RECQL, RET, SDHA, SDHAF2, SDHB, SDHC, ,SDHD, SMAD4, SMARCA4, SMARCB1, SMARCE1, STK11, SUFU, JVQL762, TP53, TSC1, TSC2, VHL, and XRCC2. In addition, no clinically relevant aberrant RNA transcripts were detected in select genes. Please refer to genetic test report for technical details. 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 Because the significance of the SOLANGE gene VUS is unknown, medical management must be based on Ms. Solis's personal history and family history of cancer. We discussed that Ms. Solis's negative test result greatly reduces the likelihood that her personal history of cancer is due to a hereditary mutation. It is possible that her personal history of cancer may be due to a gene for which testing was not performed or which has yet to be discovered. RECOMMENDATIONS  1) The outcome of Ms. Pressley genetic test results do not affect her current cancer treatment. Ms. Marilu Blank should continue cancer treatment and surveillance as directed by her physicians. 2) Ms. Marilu Blank should continue general population cancer screening guidelines as directed by her physicians. RECOMMENDATIONS FOR FAMILY MEMBERS   1) It is possible that the cancers in Ms. Sinclairs family are due to a hereditary gene mutation that she did not inherit. Therefore, her relatives (particularly those with a current or previous cancer diagnosis) may consider genetic counseling and testing to clarify this possibility. Relatives may contact the [employer] at [phone] or locate a genetic counselor at www. [email or web address]. [email or web address].     2) We encourage Ms. Sinclairs relatives to discuss their family history of cancer with their physicians to determine the most appropriate cancer screening recommendations. SUMMARY & PLAN   1) Ms. Pressley genetic test results are negative meaning there were no clinically significant mutations detected in the 77 genes analyzed. 2) A variant of uncertain significance was detected in the SOLANGE gene(s). We will contact Ms. Marilu Blank when there is additional information available regarding the classification of the genetic variant(s). 3) There are no changes in medical management for Ms. Solis based on her negative genetic test results. 4) We encourage Ms. Solis to contact us every 1-2 years to determine if there are any new genetic testing or research options available. 5) We encourage Ms. Solis to contact us with updates to her personal and/or familys cancer history as this information may alter our assessment and/or recommendations. The [address] [facility] National Program would be glad to offer our assistance should you have any questions or concerns about this information. Please feel free to contact us at [phone]. Chiara Lazcano.  Janet Beltran MS, [facility]   Licensed Genetic Counselor         CC:  Ms. Abdifatah Dover, [facility]

## 2022-09-13 ENCOUNTER — TELEPHONE (OUTPATIENT)
Dept: GYNECOLOGIC ONCOLOGY | Age: 32
End: 2022-09-13

## 2022-09-19 ENCOUNTER — HOSPITAL ENCOUNTER (OUTPATIENT)
Dept: ULTRASOUND IMAGING | Age: 32
Discharge: HOME | End: 2022-09-21
Payer: COMMERCIAL

## 2022-09-19 DIAGNOSIS — Z97.5 IUD (INTRAUTERINE DEVICE) IN PLACE: ICD-10-CM

## 2022-09-19 DIAGNOSIS — C54.1 ENDOMETRIAL CANCER (HCC): ICD-10-CM

## 2022-09-19 PROCEDURE — 76856 US EXAM PELVIC COMPLETE: CPT

## 2022-09-19 PROCEDURE — 76830 TRANSVAGINAL US NON-OB: CPT

## 2022-10-05 ENCOUNTER — HOSPITAL ENCOUNTER (EMERGENCY)
Age: 32
Discharge: HOME | End: 2022-10-05
Attending: EMERGENCY MEDICINE
Payer: COMMERCIAL

## 2022-10-05 VITALS
WEIGHT: 219 LBS | DIASTOLIC BLOOD PRESSURE: 100 MMHG | BODY MASS INDEX: 37.59 KG/M2 | HEART RATE: 82 BPM | OXYGEN SATURATION: 100 % | TEMPERATURE: 98.8 F | SYSTOLIC BLOOD PRESSURE: 142 MMHG | RESPIRATION RATE: 18 BRPM

## 2022-10-05 DIAGNOSIS — K08.89 PAIN, DENTAL: Primary | ICD-10-CM

## 2022-10-05 PROCEDURE — 99283 EMERGENCY DEPT VISIT LOW MDM: CPT

## 2022-10-05 RX ORDER — AMOXICILLIN AND CLAVULANATE POTASSIUM 875; 125 MG/1; MG/1
1 TABLET, FILM COATED ORAL 2 TIMES DAILY
Qty: 20 TABLET | Refills: 0 | Status: SHIPPED | OUTPATIENT
Start: 2022-10-05 | End: 2022-10-15

## 2022-10-05 ASSESSMENT — PAIN DESCRIPTION - DESCRIPTORS: DESCRIPTORS: ACHING

## 2022-10-05 ASSESSMENT — PAIN - FUNCTIONAL ASSESSMENT: PAIN_FUNCTIONAL_ASSESSMENT: 0-10

## 2022-10-05 ASSESSMENT — ENCOUNTER SYMPTOMS
SORE THROAT: 0
GASTROINTESTINAL NEGATIVE: 1
BACK PAIN: 0
RESPIRATORY NEGATIVE: 1
ABDOMINAL DISTENTION: 0

## 2022-10-05 ASSESSMENT — PAIN DESCRIPTION - LOCATION: LOCATION: TEETH

## 2022-10-05 ASSESSMENT — PAIN DESCRIPTION - ORIENTATION: ORIENTATION: LEFT;LOWER

## 2022-10-05 ASSESSMENT — PAIN SCALES - GENERAL: PAINLEVEL_OUTOF10: 8

## 2022-10-05 NOTE — DISCHARGE INSTRUCTIONS
Follow-up with a dentist as soon as possible. Take the antibiotics as prescribed. Tylenol or ibuprofen for pain as needed.

## 2022-10-05 NOTE — ED PROVIDER NOTES
[facility] ED  EMERGENCY DEPARTMENT ENCOUNTER      Pt Name: Anny Pruitt  MRN: [id number]  [place] 1990  Date of evaluation: 10/5/2022  Provider: Tanna Olivares [address] [place] [address]       Chief Complaint   Patient presents with    Dental Pain     Left lower, onset 2 days ago         HISTORY OF PRESENT ILLNESS   (Location/Symptom, Timing/Onset, Context/Setting, Quality, Duration, Modifying Factors, Severity)  Note limiting factors. Anny Pruitt is a 34 y.o. female who presents to the emergency department with complains of left lower dental pain for the past 2 days. Patient has not taken anything for the pain and she has not followed up with any dentist either. She denies any fever or chills. She denies having any history of dental abscesses. She still able to eat and drink normally. REVIEW OF SYSTEMS    (2-9 systems for level 4, 10 or more for level 5)     Review of Systems   Constitutional:  Negative for fever. HENT:  Positive for dental problem. Negative for congestion and sore throat. Eyes:  Negative for visual disturbance. Respiratory: Negative. Cardiovascular: Negative. Gastrointestinal: Negative. Negative for abdominal distention. Musculoskeletal:  Negative for back pain. Skin: Negative. Neurological: Negative. Psychiatric/Behavioral: Negative. Negative for suicidal ideas. Except as noted above the remainder of the review of systems was reviewed and negative. PAST MEDICAL HISTORY     Past Medical History:   Diagnosis Date    Cancer [facility])     endometrial  diagnosed  fall 2020  Dr. Jaspreet Robldeo    GERD (gastroesophageal reflux disease)     resolved since bariatric sx     Hypertension     R.  Fruth-NP    Obesity     PCOS (polycystic ovarian syndrome)     on metformin    Sleep apnea     cpap    Snores     Under care of team     Dr. Mike Zhang [facility]  Bariatric  last seen 6/2022    Under care of team     Nadya Ramirez  sleep doc   Guera Birmingham [facility] last seen 2022    Wears contact lenses     Wears prescription eyeglasses     Wellness examination     KAYLA Lay-TK PCP last visit 2022  sees q 6 mos-[place]         SURGICAL HISTORY       Past Surgical History:   Procedure Laterality Date    BARIATRIC SURGERY      3/9/2022   Dr. Claudene High in [facility],, [id number]  N/A 11/06/2019    DILATATION AND CURETTAGE HYSTEROSCOPY-POLYPECTOMY performed by Scott Sharma MD at [address] 09/08/2020    DILATATION AND CURETTAGE, MIRENA PLACEMENT performed by John Cardenas MD at [address] [facility] N/A 6/20/2022    EUA, DILATATION AND CURETTAGE HYSTEROSCOPY, PLACEMENT OF MIRENA IUD performed by Emilee Sanchez MD at [address] [place] [address], [place], DIAGNOSTIC      6/2021    HYSTEROSCOPY  06/20/2022    EUA, DILATATION AND CURETTAGE HYSTEROSCOPY, PLACEMENT OF MIRENA IUD         CURRENT MEDICATIONS       Previous Medications    ACETAMINOPHEN (TYLENOL) 500 MG TABLET    Take 2 tablets by mouth every 6 hours as needed for Pain    HYDROXYZINE (VISTARIL) 25 MG CAPSULE    25 mg 2 times daily as needed     LABETALOL (NORMODYNE) 100 MG TABLET    Take 100 mg by mouth 2 times daily Pt is taking 50mg in the am and 50 mg at bedtime for a total of 100mg    MEGESTROL (MEGACE) 40 MG TABLET    Take 2 tablets by mouth daily    METFORMIN (GLUCOPHAGE) 500 MG TABLET    TAKE ONE TABLET BY MOUTH THREE TIMES DAILY    MINOCYCLINE (MINOCIN;DYNACIN) 100 MG CAPSULE    TAKE 1 CAPSULE BY MOUTH TWICE DAILY    MULTIPLE VITAMINS-MINERALS (MULTI FOR HER PO)    Take 1 capsule by mouth daily    OMEPRAZOLE (PRILOSEC) 20 MG DELAYED RELEASE CAPSULE    TAKE 1 CAPSULE BY MOUTH ONCE DAILY    ONDANSETRON (ZOFRAN ODT) 4 MG DISINTEGRATING TABLET    Take 1 tablet by mouth every 8 hours as needed for Nausea or Vomiting       ALLERGIES     Adhesive tape    FAMILY HISTORY       Family History   Problem Relation Age of Onset    Cancer Mother     Cancer Father     Other Father         seeing a cardiologist for heart problems    Diabetes Maternal Grandmother     Heart Disease Maternal Grandmother     Cancer Paternal Grandmother     Cancer Paternal Grandfather           SOCIAL HISTORY       Social History     Socioeconomic History    Marital status: Single     Spouse name: None    Number of children: None    Years of education: None    Highest education level: None   Tobacco Use    Smoking status: Never    Smokeless tobacco: Never   Vaping Use    Vaping Use: Never used   Substance and Sexual Activity    Alcohol use: Not Currently     Alcohol/week: 0.0 standard drinks     Types: 1 Glasses of wine, 1 Standard drinks or equivalent per week     Comment: Occ    Drug use: No    Sexual activity: Yes     Partners: Male       SCREENINGS        [place] Coma Scale  Eye Opening: Spontaneous  Best Verbal Response: Oriented  Best Motor Response: Obeys commands  Brandon Coma Scale Score: 15               PHYSICAL EXAM    (up to 7 for level 4, 8 or more for level 5)     ED Triage Vitals [10/05/22 1652]   BP Temp Temp Source Heart Rate Resp SpO2 Height Weight   (!) 142/100 98.8 °F (37.1 °C) Oral 82 18 100 % -- 219 lb (99.3 kg)       Physical Exam  Constitutional:       General: She is not in acute distress. Appearance: Normal appearance. She is not toxic-appearing. HENT:      Head: Normocephalic and atraumatic. Mouth/Throat:      Mouth: Mucous membranes are moist.        Comments: The left lower molar tooth seems to be cutting into the gumline posteriorly. There is no sign of erythema or abscess. No swelling appreciated. Eyes:      Extraocular Movements: Extraocular movements intact. Pupils: Pupils are equal, round, and reactive to light. Cardiovascular:      Rate and Rhythm: Normal rate and regular rhythm. Pulses: Normal pulses. Heart sounds: Normal heart sounds.    Pulmonary:      Effort: Pulmonary effort is normal.      Breath sounds: Normal breath sounds. Abdominal:      General: Abdomen is flat. Bowel sounds are normal.      Palpations: Abdomen is soft. Musculoskeletal:         General: Normal range of motion. Skin:     General: Skin is warm and dry. Capillary Refill: Capillary refill takes less than 2 seconds. Neurological:      General: No focal deficit present. Mental Status: She is alert and oriented to person, place, and time. Psychiatric:         Mood and Affect: Mood normal.       DIAGNOSTIC RESULTS     LABS:  Labs Reviewed - No data to display    All other labs were within normal range or not returned as of this dictation. EMERGENCY DEPARTMENT COURSE and DIFFERENTIAL DIAGNOSIS/MDM:   Vitals:    Vitals:    10/05/22 1652   BP: (!) 142/100   Pulse: 82   Resp: 18   Temp: 98.8 °F (37.1 °C)   TempSrc: Oral   SpO2: 100%   Weight: 219 lb (99.3 kg)       REASSESSMENT      Inform patient that I will start her on some antibiotics in case there is an infection brewing with her left posterior molar tooth. I gave her resources for nearby dental clinics as she was asking for some that she can follow-up closely with. Advised her to take Tylenol and ibuprofen for pain as needed. She understands and has no other questions or concerns. FINAL IMPRESSION      1. Pain, dental          DISPOSITION/PLAN   DISPOSITION Decision To Discharge 10/05/2022 06:51:55 PM      PATIENT REFERRED TO:  Isaura Mckeon  [address] [facility] [address] [place] [id number]  [phone]      As needed    DISCHARGE MEDICATIONS:  New Prescriptions    AMOXICILLIN-CLAVULANATE (AUGMENTIN) 875-125 MG PER TABLET    Take 1 tablet by mouth 2 times daily for 10 days       (Please note that portions of this note were completed with a voice recognition program.  Efforts were made to edit the dictations but occasionally words are mis-transcribed.)    Wendy Polanco DO (electronically signed)  Attending Emergency Physician           Wendy Polanco DO  10/05/22 4783

## 2022-11-03 ENCOUNTER — HOSPITAL ENCOUNTER (OUTPATIENT)
Age: 32
Setting detail: SPECIMEN
Discharge: HOME | End: 2022-11-03

## 2022-11-03 ENCOUNTER — OFFICE VISIT (OUTPATIENT)
Dept: GYNECOLOGIC ONCOLOGY | Age: 32
End: 2022-11-03
Payer: COMMERCIAL

## 2022-11-03 VITALS
WEIGHT: 211.4 LBS | HEIGHT: 64 IN | SYSTOLIC BLOOD PRESSURE: 148 MMHG | BODY MASS INDEX: 36.09 KG/M2 | DIASTOLIC BLOOD PRESSURE: 97 MMHG | HEART RATE: 86 BPM | OXYGEN SATURATION: 99 %

## 2022-11-03 DIAGNOSIS — C54.1 ENDOMETRIAL ADENOCARCINOMA (HCC): Primary | ICD-10-CM

## 2022-11-03 LAB
CONTROL: NORMAL
PREGNANCY TEST URINE, POC: NEGATIVE

## 2022-11-03 PROCEDURE — G8427 DOCREV CUR MEDS BY ELIG CLIN: HCPCS | Performed by: PHYSICIAN ASSISTANT

## 2022-11-03 PROCEDURE — G8484 FLU IMMUNIZE NO ADMIN: HCPCS | Performed by: PHYSICIAN ASSISTANT

## 2022-11-03 PROCEDURE — 3074F SYST BP LT 130 MM HG: CPT | Performed by: PHYSICIAN ASSISTANT

## 2022-11-03 PROCEDURE — 58100 BIOPSY OF UTERUS LINING: CPT | Performed by: PHYSICIAN ASSISTANT

## 2022-11-03 PROCEDURE — 1036F TOBACCO NON-USER: CPT | Performed by: PHYSICIAN ASSISTANT

## 2022-11-03 PROCEDURE — G8417 CALC BMI ABV UP PARAM F/U: HCPCS | Performed by: PHYSICIAN ASSISTANT

## 2022-11-03 PROCEDURE — 99214 OFFICE O/P EST MOD 30 MIN: CPT | Performed by: PHYSICIAN ASSISTANT

## 2022-11-03 PROCEDURE — 3078F DIAST BP <80 MM HG: CPT | Performed by: PHYSICIAN ASSISTANT

## 2022-11-03 PROCEDURE — 81025 URINE PREGNANCY TEST: CPT | Performed by: PHYSICIAN ASSISTANT

## 2022-11-03 RX ORDER — PHENTERMINE AND TOPIRAMATE 7.5; 46 MG/1; MG/1
1 CAPSULE, EXTENDED RELEASE ORAL DAILY
COMMUNITY
Start: 2022-09-22

## 2022-11-03 NOTE — PROGRESS NOTES
[facility], [facility], [id number] Adela Gunter is a 34 y.o. female who presents for her endometrial cancer surveillance visit    Chief Complaint:  Endometrial cancer    HPI: She is a pleasant  female who was initially following with her local OB/GYN for long standing history of amenorrhea. She was then found to have complex endometrial hyperplasia with atypia on D&C in 2019. She had a long standing history of abnormal menstruation, and amenorrhea. She was advised to have a pelvic ultrasound which was obtained on 2020 and revealed a thickened endometrial stripe at 1.6 cm. It was discussed in detail at this time that the patient was desiring future fertility. She wanted to approach her symptoms and diagnoses with fertility-sparing measures. It was also advised that she begin the work up and process for infertility and weight management to aid in her desires. Therefore recommendations for fertility sparing treatment include hormonal therapy. Pt was advised to have a pelvic MRI and plan for a repeat D&C and progesterone IUD.  was within normal limits at 6 units on 2020. Pelvic MRI on 2020 for further evaluation of the uterus measuring 3.6 x 4.7 x 7.1 cm, the endometrial measures 18 mm in AP thickness with cystic changes noted. No evidence of invasive disease. She was then taken to the OR on 2020 for a D&C and Mirena IUD placement. Final pathology revealed a well-differentiated endometrioid carcinoma, FIGO grade 1 in the background of atypical complex hyperplasia. She was advised to have serial transvaginal ultrasound and endometrial biopsies every 3 months for 3 consecutive negative biopsy results. She underwent a pelvic ultrasound was performed on 2020 which has revealed a decrease in her endometrial stripe, now measuring 8.9 mm.  Uterus measures 7.6 x 4.3 x 4.7 cm, with normal myometrial echo texture. IUD was noted to be the appropriate position. No adnexal pathology. Follow up endometrial biopsy performed on 12/18/2020 revealed endometrial incision with hormone effect, negative for residual atypical glandular proliferation or malignancy. A pelvic ultrasound performed on 3/22/2021 revealed the uterus measuring 6.7 x 4.8 x 3.4 cm. Her endometrial stripe is now measuring 1.1 cm, within normal limits for premenopausal female. The position of the IUD in the cavity. Bilateral ovaries within normal limits. There is no evidence of free fluid. Endometrial biopsy obtained on 3/25/21 was negative for atypical hyperplasia or malignancy. Follow up pelvic ultrasound on 7/9/21 revealed uterus measuring 7.1 x 5.3 x 4.0 cm, endometrial stripe 1.7 cm, upper limits of normal. Bilateral ovaries within normal limits. Repeat endometrial biopsy on 7/15/21 revealed small foci of residual complex atypical gland most compatible with residual endometrioid adenocarcinoma. She was counsled to continue progesterone treatment as patient continues to desire fertility and has been following with the [facility] bariatric program for surgery. Pelvic ultrasound on 10/12/2021 measured 7.2 x 3.5 x 5.3 cm the endometrial stripe is 7.2 mm. IUD noted. Bilateral ovaries within normal limits. 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 Julissa Landin. Follow-up endometrial biopsy in June 2022 revealed a well-differentiated endometrioid adenocarcinoma. She return to clinic in consultation with Dr. Jaspreet Robledo and recommended a D&C with replacement IUD as the patient still wished to continue with conservative measures. She underwent the hysteroscopy dilation and curettage with Mirena IUD placement on 6/20/2022.   Endometrial curettings revealed an inactive endometrium with progesterone effect. There are scant fragments of atypical glands concerning for residual endometrioid adenocarcinoma FIGO grade 1. At this time she was also placed on Megace therapy 40 mg twice daily. She is since follow-up with her primary care provider and been placed on an appetite suppressant as well to help in her appetite cravings while on the Megace. Today, she is doing well. She continues to lose weight post bypass surgery. She states that she has no abdominal or pelvic pain. Last menstrual cycle was November 26 she states the length of her menses is approximately 7 to 10 days and does report what she feels is moderate to severe dysmenorrhea. She takes Tylenol with moderate relief. She is unable to take NSAIDs due to bypass. She denies changes to her urination or bowel habits. Denies dysuria or hematuria. No constipation or diarrhea. Review of Systems  I have personally reviewed and agree with the review of the systems done by my ancillary staff in the [facility] documentation. Objective:  Vitals:    11/03/22 1458   BP: (!) 148/97   Pulse: 86   SpO2: 99%   Weight: 211 lb 6.4 oz (95.9 kg)   Height: 5' 4\" (1.626 m)       Physical Exam  General: well appearing, alert and oriented x 3, no acute distress. HEENT: No cervical or supraclavicular lymphadenopathy. No thyromegaly. CV: Regular rate and sinus rhythm, no murmurs or gallops detected. Lungs: Clear to auscultate bilaterally to the bases    Abdomen:  Soft. Multiple laparoscopic incision scars present from recent surgery. No palpable masses or ascites. Non tender to deep palpation throughout abdominal cavity. No detectable organomegaly. No inguinal lymphadenopathy bilaterally. Extremities: No edema, deformities, or calf tenderness bilaterally    Pelvic Exam using a medium speculum reveals no blood or discharge in the vaginal canal. The cervix is visualized and IUD strings are present.  No odor or foul discharge. The cervix was then cleansed with betadine and a single tooth tenaculum was used on the anterior cervix. The endometrial Pipelle was then advanced and the endometrium was sounded to 8 cm. Pass was made with Pipelle and tissue was obtained. The tenaculum was removed and direct pressure was applied. Adequate hemostasis was achieved. The patient tolerated without incident. Bimanual examination reveals a smooth vaginal canal, no palpable nodule or firmness noted. The IUD strings are palpable and the cervix is noted to be without firmness or nodularity. No palpable masses or enlarged pathology. No palpable adnexal pathology. Lab Results   Component Value Date/Time     6 08/07/2020 02:19 PM       Assessment:  Cancer Staging  Endometrial cancer [facility])  Staging form: Corpus Uteri - Carcinoma And Carcinosarcoma, AJCC 8th Edition  - Clinical stage from 9/17/2020: FIGO Stage I (cT1, cN0, cM0) - Signed by Savage Chaves MD on 9/17/2020  Staging comments: The patient is a young infertility patient and did not wish to have hysterectomy. She was therefore staged only clinically. An MRI of the pelvis was performed. 1. Endometrial adenocarcinoma ([facility] [address]      Plan:  1. Will await endometrial biopsy taken today and notify patient of results accordingly    Discussion: We reviewed her most recent ultrasound results which revealed a normal-appearing size uterus, the endometrium was 1.6 cm in thickness. The myometrium was noted to be heterogeneous with indistinct intranasal margins and increased vascularity. No discrete myometrial lesion. It could be possibly related to adenomyosis. Discussed management plan if there are any atypical findings or underlying residual malignancy on today's biopsy, the patient states at this point she would likely proceed with a definitive hysterectomy.     We did review there could be options also increase her current Megace dose if there are atypical or malignant cells present in the case that she may desiring fertility. If her biopsy is negative we may discuss continuation of the current IUD and Megace therapy. However we do want to keep in mind her dysmenorrhea that she is currently experiencing as well with possible adenomyosis. At this point in time she will be scheduled to follow up with discussion of her pathology results and the pending days ahead.     Electronically signed by Kelvin Carl PA-C on 11/3/2022 at 4:00 PM EDT

## 2022-11-07 LAB — SURGICAL PATHOLOGY REPORT: NORMAL

## 2022-11-08 NOTE — RESULT ENCOUNTER NOTE
Patient notified her endometrial biopsy results negative for atypia or malignancy. Reviewed options for management of continuing current IUD and oral progesterone treatment and repeat endometrial biopsy vs return to clinic to discuss definitive treatment with surgery. Patient wishes to continue with progesterone treatment and return to clinic in 4-5 months for repeat endometrial biopsy. We reviewed she will call clinic sooner should she wish to have change in treatment prior to her next appointment.

## 2023-01-10 ENCOUNTER — HOSPITAL ENCOUNTER (OUTPATIENT)
Age: 33
Setting detail: SPECIMEN
Discharge: HOME | End: 2023-01-10

## 2023-01-12 LAB — SURGICAL PATHOLOGY REPORT: NORMAL

## 2023-02-15 ENCOUNTER — PATIENT MESSAGE (OUTPATIENT)
Dept: GYNECOLOGIC ONCOLOGY | Age: 33
End: 2023-02-15

## 2023-02-15 DIAGNOSIS — C54.1 ENDOMETRIAL ADENOCARCINOMA (HCC): Primary | ICD-10-CM

## 2023-02-15 DIAGNOSIS — L73.8 BACTERIAL FOLLICULITIS: ICD-10-CM

## 2023-02-15 DIAGNOSIS — Z97.5 IUD (INTRAUTERINE DEVICE) IN PLACE: ICD-10-CM

## 2023-02-28 ENCOUNTER — HOSPITAL ENCOUNTER (OUTPATIENT)
Dept: ULTRASOUND IMAGING | Age: 33
Discharge: HOME | End: 2023-03-02
Payer: COMMERCIAL

## 2023-02-28 DIAGNOSIS — C54.1 ENDOMETRIAL ADENOCARCINOMA (HCC): ICD-10-CM

## 2023-02-28 DIAGNOSIS — Z97.5 IUD (INTRAUTERINE DEVICE) IN PLACE: ICD-10-CM

## 2023-02-28 PROCEDURE — 76830 TRANSVAGINAL US NON-OB: CPT

## 2023-03-15 ENCOUNTER — HOSPITAL ENCOUNTER (OUTPATIENT)
Age: 33
Setting detail: SPECIMEN
Discharge: HOME | End: 2023-03-15

## 2023-03-15 ENCOUNTER — OFFICE VISIT (OUTPATIENT)
Dept: GYNECOLOGIC ONCOLOGY | Age: 33
End: 2023-03-15

## 2023-03-15 VITALS
SYSTOLIC BLOOD PRESSURE: 142 MMHG | HEIGHT: 64 IN | WEIGHT: 186 LBS | BODY MASS INDEX: 31.76 KG/M2 | OXYGEN SATURATION: 96 % | HEART RATE: 90 BPM | DIASTOLIC BLOOD PRESSURE: 95 MMHG

## 2023-03-15 DIAGNOSIS — N83.201 CYST OF RIGHT OVARY: Primary | ICD-10-CM

## 2023-03-15 DIAGNOSIS — C54.1 ENDOMETRIAL ADENOCARCINOMA (HCC): ICD-10-CM

## 2023-03-15 LAB
CONTROL: NORMAL
PREGNANCY TEST URINE, POC: NEGATIVE

## 2023-03-15 RX ORDER — BUPROPION HYDROCHLORIDE 300 MG/1
300 TABLET ORAL EVERY MORNING
COMMUNITY

## 2023-03-15 ASSESSMENT — ENCOUNTER SYMPTOMS
EYES NEGATIVE: 1
RESPIRATORY NEGATIVE: 1
RECTAL PAIN: 1

## 2023-03-15 NOTE — PROGRESS NOTES
[address] [facility], [facility], [id number] Adela Barrientos is a 28 y.o. female who presents for her endometrial cancer surveillance visit    Chief Complaint:  Endometrial cancer    HPI: She is a pleasant [de-identified] female who was initially following with her local OB/GYN for long standing history of amenorrhea. She was then found to have complex endometrial hyperplasia with atypia on D&C in November 2019. She had a long standing history of abnormal menstruation, and amenorrhea. She was advised to have a pelvic ultrasound which was obtained on 8/7/2020 and revealed a thickened endometrial stripe at 1.6 cm. It was discussed in detail at this time that the patient was desiring future fertility. She wanted to approach her symptoms and diagnoses with fertility-sparing measures. It was also advised that she begin the work up and process for infertility and weight management to aid in her desires. Therefore recommendations for fertility sparing treatment include hormonal therapy. Pt was advised to have a pelvic MRI and plan for a repeat D&C and progesterone IUD.  was within normal limits at 6 units on 8/7/2020. Pelvic MRI on 8/25/2020 for further evaluation of the uterus measuring 3.6 x 4.7 x 7.1 cm, the endometrial measures 18 mm in AP thickness with cystic changes noted. No evidence of invasive disease. She was then taken to the OR on 9/8/2020 for a D&C and Mirena IUD placement. Final pathology revealed a well-differentiated endometrioid carcinoma, FIGO grade 1 in the background of atypical complex hyperplasia. She was advised to have serial transvaginal ultrasound and endometrial biopsies every 3 months for 3 consecutive negative biopsy results. She underwent a pelvic ultrasound was performed on 12/16/2020 which has revealed a decrease in her endometrial stripe, now measuring 8.9 mm.  Uterus measures 7.6 x 4.3 x 4.7 cm, with normal myometrial echo texture. IUD was noted to be the appropriate position. No adnexal pathology. Follow up endometrial biopsy performed on 12/18/2020 revealed endometrial incision with hormone effect, negative for residual atypical glandular proliferation or malignancy. A pelvic ultrasound performed on 3/22/2021 revealed the uterus measuring 6.7 x 4.8 x 3.4 cm. Her endometrial stripe is now measuring 1.1 cm, within normal limits for premenopausal female. The position of the IUD in the cavity. Bilateral ovaries within normal limits. There is no evidence of free fluid. Endometrial biopsy obtained on 3/25/21 was negative for atypical hyperplasia or malignancy. Follow up pelvic ultrasound on 7/9/21 revealed uterus measuring 7.1 x 5.3 x 4.0 cm, endometrial stripe 1.7 cm, upper limits of normal. Bilateral ovaries within normal limits. Repeat endometrial biopsy on 7/15/21 revealed small foci of residual complex atypical gland most compatible with residual endometrioid adenocarcinoma. She was counsled to continue progesterone treatment as patient continues to desire fertility and has been following with the [facility] bariatric program for surgery. Pelvic ultrasound on 10/12/2021 measured 7.2 x 3.5 x 5.3 cm the endometrial stripe is 7.2 mm. IUD noted. Bilateral ovaries within normal limits. 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 Mireille Saleem. Follow-up endometrial biopsy in June 2022 revealed a well-differentiated endometrioid adenocarcinoma. She return to clinic in consultation with Dr. Taz Samuel and recommended a D&C with replacement IUD as the patient still wished to continue with conservative measures. She underwent the hysteroscopy dilation and curettage with Mirena IUD placement on 6/20/2022.   Endometrial curettings revealed an inactive endometrium with progesterone effect. There are scant fragments of atypical glands concerning for residual endometrioid adenocarcinoma FIGO grade 1. She was placed on Megace therapy 40 mg twice daily for dual progesterone therapy. A repeat endometrial biopsy on 11/3/22 was negative for atypia or malignancy. There was stromal decidual change consistent with progesterone effect. Patient wished to remain on conservative therapy with IUD and oral progesterone and repeat endometrial sampling. Today, she presents for planned endometrial sampling. She was previously placed on an appetite suppressant by her PCP however she reports she has since stopped this mediation due to personal choice. Most recent pelvic ultrasound on 2/28/23 revealed uterus normal in size, 6.5 x 5 x 4.1 cm. Endometrial stripe 4.6 mm. IUD placement visualized. The right ovary had a 2.2 cm cystic lesion with few nodular foci along the margin likely benign follicular cyst however short-term follow-up repeat pelvic ultrasound advised. Unremarkable appearance of the left ovary. She reports being anxious for today's visit. She denies abdominal or pelvic pain. She states her last menses was December 2022. She has not had any subsequent bleeding since. She states she has been experiencing rectal spasms once weekly that last seconds and dissipate spontaneously. She states she has normal urination. She reports normal bowel movements she gives a bowel movement every other day, she denies constipation. No blood or mucus in the stool. Her appetite is stable, no nausea or vomiting. She is excited as she reports she has become active in the bariatric community and has future plans for upcoming events in this community. Review of Systems  I have personally reviewed and agree with the review of the systems done by my ancillary staff in the [facility] documentation.     Objective:  Vitals:    03/15/23 1520 03/15/23 1529   BP: (!) 138/96 (!) 142/95 Site: Left Lower Arm Right Upper Arm   Position: Sitting Sitting   Cuff Size: Medium Adult Medium Adult   Pulse: 90    SpO2: 96%    Weight: 186 lb (84.4 kg)    Height: 5' 4\" (1.626 m)        Physical Exam  General: well appearing, alert and oriented x 3, no acute distress. HEENT: No cervical or supraclavicular lymphadenopathy. No thyromegaly. CV: Regular rate and sinus rhythm, no murmurs or gallops detected. Lungs: Clear to auscultate bilaterally to the bases    Abdomen:  Soft. Multiple laparoscopic incision scars present from recent surgery. No palpable masses or ascites. Non tender to deep palpation throughout abdominal cavity. No detectable organomegaly. No inguinal lymphadenopathy bilaterally. Extremities: No edema, deformities, or calf tenderness bilaterally    Pelvic Exam using a medium speculum reveals no blood or discharge in the vaginal canal. The cervix is visualized and IUD strings are present. No odor or foul discharge. The cervix was then cleansed with betadine and a single tooth tenaculum was used on the anterior cervix. The endometrial Pipelle was then advanced and the endometrium was sounded to 7 cm. Pass was made with Pipelle and tissue was obtained. The tenaculum was removed and direct pressure was applied. Adequate hemostasis was achieved. The patient tolerated without incident. Bimanual examination reveals a smooth vaginal canal, no palpable nodule or firmness noted. The IUD strings are palpable and the cervix is noted to be without firmness or nodularity. No palpable masses or enlarged pathology. No palpable adnexal pathology. Patient mildly tender to deep palpation in RLQ. Assessment:  Cancer Staging  Endometrial cancer [facility])  Staging form: Corpus Uteri - Carcinoma And Carcinosarcoma, AJCC 8th Edition  - Clinical stage from 9/17/2020: FIGO Stage I (cT1, cN0, cM0) - Signed by Sterling Nichols MD on 9/17/2020  Staging comments:  The patient is a young infertility patient and did not wish to have hysterectomy. She was therefore staged only clinically. An MRI of the pelvis was performed. 1. Cyst of right ovary    2. Endometrial adenocarcinoma ([address]      Plan:  1. Will await endometrial biopsy taken today and notify patient of results accordingly  2. Recommend repeat transvaginal ultrasound in 3 weeks for follow up adnexal cyst   3. Patient will try sitz bath to aid in her rectal spasms she will notify office if her symptoms worsen she may need additional work-up. Discussion:  Discussed management plan if there are any atypical findings or underlying residual malignancy on today's biopsy, the patient states she would likely proceed with a definitive hysterectomy. She states she and her boyfriend are on the same page for this care plan. We did review there could be options to increase her current Megace dose if there are atypical or malignant cells present in the case that she may desiring fertility however patient is not keen on this care plan as the repeat biopsies are leading to a quality of life concern for her physical and mental state. She does however disclose that if her upcoming biopsies are negative that she and her  would like to attempt to achieve pregnancy. She states she will be at peace with which ever management plan she chooses based on the upcoming biopsy results.     Electronically signed by Wilbert Martinez PA-C on 3/15/2023 at 4:00 PM EDT

## 2023-03-15 NOTE — PROGRESS NOTES
Review of Systems   Constitutional: Negative. HENT:  Negative. Eyes: Negative. Respiratory: Negative. Cardiovascular: Negative. Gastrointestinal:  Positive for rectal pain (sharp pain). Endocrine: Negative. Genitourinary: Negative. Musculoskeletal: Negative. Skin: Negative. Neurological: Negative. Hematological:  Bruises/bleeds easily.

## 2023-03-18 LAB — SURGICAL PATHOLOGY REPORT: NORMAL

## 2023-03-21 DIAGNOSIS — Z91.89 AT RISK FOR FERTILITY PROBLEMS: ICD-10-CM

## 2023-03-21 DIAGNOSIS — C54.1 ENDOMETRIAL ADENOCARCINOMA (HCC): Primary | ICD-10-CM

## 2023-03-21 DIAGNOSIS — Z97.5 IUD (INTRAUTERINE DEVICE) IN PLACE: ICD-10-CM

## 2023-03-27 ENCOUNTER — TELEPHONE (OUTPATIENT)
Dept: GYNECOLOGIC ONCOLOGY | Age: 33
End: 2023-03-27

## 2023-03-27 NOTE — TELEPHONE ENCOUNTER
Pt called and would like to know if she can follow with her local Freddy Carlin. Pt states she does not want to pursue IBF at this time, and is confident Dr. Genet Carlin knows her history and tx plan. Please advise pt if she continue follow ups with OB.

## 2023-03-28 NOTE — TELEPHONE ENCOUNTER
Returned call to patient to answer her concerns. The patient states that she does not want to meet with reproductive endocrinology due to Hol[employer] beliefs. The patient states she has spoken with Dr. Jeremiah Marroquin office and they are able to offer the patient \"medication for ovulation\" as well as sperm analysis if needed and patient and partner are willing to pursue ovulation assistance if needed. Advised the patient that we will reach out to Dr. Nadine Garcia office to confer regarding the patient's care plan. Patient states will schedule appt with Dr. Zoraida Saxena and provide our office phone/fax number for records.

## 2023-08-07 ENCOUNTER — TELEPHONE (OUTPATIENT)
Dept: GYNECOLOGIC ONCOLOGY | Age: 33
End: 2023-08-07

## 2023-08-07 NOTE — TELEPHONE ENCOUNTER
LVM to R/S 8/24/23 appt due to provider out of the office. Can schedule the week before or next available.

## 2024-09-20 ENCOUNTER — TELEPHONE (OUTPATIENT)
Dept: GYNECOLOGIC ONCOLOGY | Age: 34
End: 2024-09-20

## (undated) DEVICE — SOLUTION SCRB 4OZ 2% CHG FOR SURG SCRBBING HND WSH

## (undated) DEVICE — SYSTEM WST MGMT W/ CAP AVETA

## (undated) DEVICE — SYSTEM WST MGMT AVETA

## (undated) DEVICE — PAD PT POS 36 IN SURGYPAD DISP

## (undated) DEVICE — GLOVE SURG SZ 8 L12IN FNGR THK79MIL GRN LTX FREE

## (undated) DEVICE — GLOVE SURG SZ 6 THK91MIL LTX FREE SYN POLYISOPRENE ANTI

## (undated) DEVICE — DRAPE,REIN 53X77,STERILE: Brand: MEDLINE

## (undated) DEVICE — PACK,LITHOTOMY: Brand: MEDLINE

## (undated) DEVICE — PAD,ABDOMINAL,8"X10",ST,LF: Brand: MEDLINE

## (undated) DEVICE — SET ENDOSCP SEAL HYSTEROSCOPE RIG OUTFLO CHN DISP MYOSURE

## (undated) DEVICE — GLOVE ORANGE PI 8   MSG9080

## (undated) DEVICE — GLOVE ORANGE PI 7   MSG9070

## (undated) DEVICE — GLOVE ORANGE PI 7 1/2   MSG9075

## (undated) DEVICE — Y-TYPE TUR/BLADDER IRRIGATION SET, REGULATING CLAMP

## (undated) DEVICE — Device

## (undated) DEVICE — TUBING, SUCTION, 9/32" X 12', STRAIGHT: Brand: MEDLINE INDUSTRIES, INC.

## (undated) DEVICE — KNIFE,COLD,STERILE, SINGLE USE: Brand: N.A.

## (undated) DEVICE — PAD,SANITARY,11 IN,MAXI,W/WINGS,N-STRL: Brand: MEDLINE

## (undated) DEVICE — PEN: MARKING STD 100/CS: Brand: MEDICAL ACTION INDUSTRIES

## (undated) DEVICE — TOWEL,OR,DSP,ST,BLUE,STD,4/PK,20PK/CS: Brand: MEDLINE

## (undated) DEVICE — SOLUTION IV 1000ML 0.9% SOD CHL FOR IRRIG PLAS CONT

## (undated) DEVICE — UNDERPANTS MAT L/XL KNIT SEAMLESS CLR CODE WAISTBAND

## (undated) DEVICE — SOLUTION SURG PREP ANTIMICROBIAL 4 OZ SKIN WND EXIDINE

## (undated) DEVICE — 1016 S-DRAPE IRRIG POUCH 10/BOX: Brand: STERI-DRAPE™

## (undated) DEVICE — SVMMC GYN MIN PK

## (undated) DEVICE — CATHETER URETH 16FR L16IN RED RUB INTMIT ROB MOD BARDX

## (undated) DEVICE — GAUZE,SPONGE,4"X4",16PLY,XRAY,STRL,LF: Brand: MEDLINE

## (undated) DEVICE — COVER OR TBL W40XL90IN ABSRB STD AND GRIPPY BK SAHARA

## (undated) DEVICE — SOLUTION IV IRRIG POUR BRL 0.9% SODIUM CHL 2F7124

## (undated) DEVICE — SOLUTION SCRB 4OZ 4% CHG H2O AIDED FOR PREOPERATIVE SKIN

## (undated) DEVICE — GOWN,AURORA,NON-REINFORCED,2XL: Brand: MEDLINE

## (undated) DEVICE — DEVICE INTRAUTERNE CONTRACEPTIVE MIRENA

## (undated) DEVICE — DEVICE TISS REM IU CANSTR VAC TB FT PEDAL DISPOSABLE MYOSURE

## (undated) DEVICE — PREP PAD BNS: Brand: CONVERTORS

## (undated) DEVICE — COVER LT HNDL BLU PLAS

## (undated) DEVICE — PAD N ADH W3XL4IN POLY COT SFT PERF FLM EASILY CUT ABSRB

## (undated) DEVICE — UNDERPANTS MAT L XL SEAMLESS CLR CODE WAISTBAND KNIT

## (undated) DEVICE — Z DISCONTINUED BY MEDLINE USE 2711682 TRAY SKIN PREP DRY W/ PREM GLV